# Patient Record
Sex: FEMALE | Race: OTHER | Employment: UNEMPLOYED | URBAN - METROPOLITAN AREA
[De-identification: names, ages, dates, MRNs, and addresses within clinical notes are randomized per-mention and may not be internally consistent; named-entity substitution may affect disease eponyms.]

---

## 2018-06-25 LAB
ANTIBODY SCREEN, EXTERNAL: NEGATIVE
CHLAMYDIA, EXTERNAL: NEGATIVE
HBSAG, EXTERNAL: NEGATIVE
HIV, EXTERNAL: NEGATIVE
N. GONORRHEA, EXTERNAL: NEGATIVE
RPR, EXTERNAL: NON REACTIVE
RUBELLA, EXTERNAL: NORMAL
TYPE, ABO & RH, EXTERNAL: NORMAL

## 2019-01-05 LAB — GRBS, EXTERNAL: NEGATIVE

## 2019-01-22 ENCOUNTER — HOSPITAL ENCOUNTER (INPATIENT)
Age: 24
LOS: 2 days | Discharge: HOME | DRG: 560 | End: 2019-01-24
Attending: OBSTETRICS & GYNECOLOGY | Admitting: OBSTETRICS & GYNECOLOGY
Payer: MEDICAID

## 2019-01-22 PROBLEM — Z33.1 IUP (INTRAUTERINE PREGNANCY), INCIDENTAL: Status: ACTIVE | Noted: 2019-01-22

## 2019-01-22 LAB
ABO + RH BLD: NORMAL
BASOPHILS # BLD: 0 K/UL (ref 0–0.1)
BASOPHILS NFR BLD: 0 % (ref 0–2)
BLOOD GROUP ANTIBODIES SERPL: NORMAL
DIFFERENTIAL METHOD BLD: ABNORMAL
EOSINOPHIL # BLD: 0.1 K/UL (ref 0–0.4)
EOSINOPHIL NFR BLD: 1 % (ref 0–5)
ERYTHROCYTE [DISTWIDTH] IN BLOOD BY AUTOMATED COUNT: 17 % (ref 11.6–14.5)
HCT VFR BLD AUTO: 36 % (ref 35–45)
HGB BLD-MCNC: 10.6 G/DL (ref 12–16)
LYMPHOCYTES # BLD: 2.1 K/UL (ref 0.9–3.6)
LYMPHOCYTES NFR BLD: 19 % (ref 21–52)
MCH RBC QN AUTO: 23.3 PG (ref 24–34)
MCHC RBC AUTO-ENTMCNC: 29.4 G/DL (ref 31–37)
MCV RBC AUTO: 79.3 FL (ref 74–97)
MONOCYTES # BLD: 0.3 K/UL (ref 0.05–1.2)
MONOCYTES NFR BLD: 3 % (ref 3–10)
NEUTS SEG # BLD: 8.6 K/UL (ref 1.8–8)
NEUTS SEG NFR BLD: 77 % (ref 40–73)
PLATELET # BLD AUTO: 249 K/UL (ref 135–420)
PMV BLD AUTO: 10.6 FL (ref 9.2–11.8)
RBC # BLD AUTO: 4.54 M/UL (ref 4.2–5.3)
SPECIMEN EXP DATE BLD: NORMAL
WBC # BLD AUTO: 11.2 K/UL (ref 4.6–13.2)

## 2019-01-22 PROCEDURE — 74011250636 HC RX REV CODE- 250/636: Performed by: ADVANCED PRACTICE MIDWIFE

## 2019-01-22 PROCEDURE — 0HQ9XZZ REPAIR PERINEUM SKIN, EXTERNAL APPROACH: ICD-10-PCS | Performed by: OBSTETRICS & GYNECOLOGY

## 2019-01-22 PROCEDURE — 74011250637 HC RX REV CODE- 250/637: Performed by: ADVANCED PRACTICE MIDWIFE

## 2019-01-22 PROCEDURE — 86900 BLOOD TYPING SEROLOGIC ABO: CPT

## 2019-01-22 PROCEDURE — 74011250636 HC RX REV CODE- 250/636

## 2019-01-22 PROCEDURE — 75410000002 HC LABOR FEE PER 1 HR

## 2019-01-22 PROCEDURE — 75410000003 HC RECOV DEL/VAG/CSECN EA 0.5 HR

## 2019-01-22 PROCEDURE — 85025 COMPLETE CBC W/AUTO DIFF WBC: CPT

## 2019-01-22 PROCEDURE — 75410000000 HC DELIVERY VAGINAL/SINGLE

## 2019-01-22 PROCEDURE — 65270000029 HC RM PRIVATE

## 2019-01-22 RX ORDER — OXYCODONE AND ACETAMINOPHEN 5; 325 MG/1; MG/1
2 TABLET ORAL
Status: DISCONTINUED | OUTPATIENT
Start: 2019-01-22 | End: 2019-01-24 | Stop reason: HOSPADM

## 2019-01-22 RX ORDER — AMOXICILLIN 250 MG
1 CAPSULE ORAL
Status: DISCONTINUED | OUTPATIENT
Start: 2019-01-22 | End: 2019-01-24 | Stop reason: HOSPADM

## 2019-01-22 RX ORDER — BUTORPHANOL TARTRATE 2 MG/ML
2 INJECTION INTRAMUSCULAR; INTRAVENOUS
Status: DISCONTINUED | OUTPATIENT
Start: 2019-01-22 | End: 2019-01-23 | Stop reason: HOSPADM

## 2019-01-22 RX ORDER — HYDROMORPHONE HYDROCHLORIDE 1 MG/ML
1 INJECTION, SOLUTION INTRAMUSCULAR; INTRAVENOUS; SUBCUTANEOUS
Status: DISCONTINUED | OUTPATIENT
Start: 2019-01-22 | End: 2019-01-23 | Stop reason: HOSPADM

## 2019-01-22 RX ORDER — OXYTOCIN/RINGER'S LACTATE 20/1000 ML
999 PLASTIC BAG, INJECTION (ML) INTRAVENOUS ONCE
Status: ACTIVE | OUTPATIENT
Start: 2019-01-22 | End: 2019-01-23

## 2019-01-22 RX ORDER — MINERAL OIL
30 OIL (ML) ORAL AS NEEDED
Status: DISCONTINUED | OUTPATIENT
Start: 2019-01-22 | End: 2019-01-23 | Stop reason: HOSPADM

## 2019-01-22 RX ORDER — OXYTOCIN 10 [USP'U]/ML
INJECTION, SOLUTION INTRAMUSCULAR; INTRAVENOUS
Status: DISPENSED
Start: 2019-01-22 | End: 2019-01-23

## 2019-01-22 RX ORDER — OXYTOCIN/RINGER'S LACTATE 20/1000 ML
125 PLASTIC BAG, INJECTION (ML) INTRAVENOUS CONTINUOUS
Status: DISCONTINUED | OUTPATIENT
Start: 2019-01-22 | End: 2019-01-23 | Stop reason: HOSPADM

## 2019-01-22 RX ORDER — ACETAMINOPHEN 325 MG/1
650 TABLET ORAL
Status: DISCONTINUED | OUTPATIENT
Start: 2019-01-22 | End: 2019-01-24 | Stop reason: HOSPADM

## 2019-01-22 RX ORDER — TERBUTALINE SULFATE 1 MG/ML
0.25 INJECTION SUBCUTANEOUS
Status: DISCONTINUED | OUTPATIENT
Start: 2019-01-22 | End: 2019-01-23 | Stop reason: HOSPADM

## 2019-01-22 RX ORDER — PROMETHAZINE HYDROCHLORIDE 25 MG/ML
25 INJECTION, SOLUTION INTRAMUSCULAR; INTRAVENOUS
Status: DISCONTINUED | OUTPATIENT
Start: 2019-01-22 | End: 2019-01-24 | Stop reason: HOSPADM

## 2019-01-22 RX ORDER — ZOLPIDEM TARTRATE 5 MG/1
5 TABLET ORAL
Status: DISCONTINUED | OUTPATIENT
Start: 2019-01-22 | End: 2019-01-24 | Stop reason: HOSPADM

## 2019-01-22 RX ORDER — NALBUPHINE HYDROCHLORIDE 10 MG/ML
10 INJECTION, SOLUTION INTRAMUSCULAR; INTRAVENOUS; SUBCUTANEOUS
Status: DISCONTINUED | OUTPATIENT
Start: 2019-01-22 | End: 2019-01-23 | Stop reason: HOSPADM

## 2019-01-22 RX ORDER — METHYLERGONOVINE MALEATE 0.2 MG/ML
0.2 INJECTION INTRAVENOUS AS NEEDED
Status: COMPLETED | OUTPATIENT
Start: 2019-01-22 | End: 2019-01-22

## 2019-01-22 RX ORDER — SODIUM CHLORIDE, SODIUM LACTATE, POTASSIUM CHLORIDE, CALCIUM CHLORIDE 600; 310; 30; 20 MG/100ML; MG/100ML; MG/100ML; MG/100ML
125 INJECTION, SOLUTION INTRAVENOUS CONTINUOUS
Status: DISCONTINUED | OUTPATIENT
Start: 2019-01-22 | End: 2019-01-23 | Stop reason: HOSPADM

## 2019-01-22 RX ORDER — OXYTOCIN/RINGER'S LACTATE 20/1000 ML
PLASTIC BAG, INJECTION (ML) INTRAVENOUS
Status: DISPENSED
Start: 2019-01-22 | End: 2019-01-23

## 2019-01-22 RX ORDER — IBUPROFEN 400 MG/1
800 TABLET ORAL
Status: DISCONTINUED | OUTPATIENT
Start: 2019-01-22 | End: 2019-01-24 | Stop reason: HOSPADM

## 2019-01-22 RX ORDER — LIDOCAINE HYDROCHLORIDE 10 MG/ML
20 INJECTION, SOLUTION EPIDURAL; INFILTRATION; INTRACAUDAL; PERINEURAL AS NEEDED
Status: DISCONTINUED | OUTPATIENT
Start: 2019-01-22 | End: 2019-01-23 | Stop reason: HOSPADM

## 2019-01-22 RX ORDER — LIDOCAINE HYDROCHLORIDE 10 MG/ML
INJECTION INFILTRATION; PERINEURAL
Status: COMPLETED
Start: 2019-01-22 | End: 2019-01-22

## 2019-01-22 RX ADMIN — IBUPROFEN 800 MG: 400 TABLET ORAL at 19:39

## 2019-01-22 RX ADMIN — LIDOCAINE HYDROCHLORIDE: 10 INJECTION, SOLUTION INFILTRATION; PERINEURAL at 18:00

## 2019-01-22 RX ADMIN — METHYLERGONOVINE MALEATE 0.2 MG: 0.2 INJECTION INTRAVENOUS at 17:41

## 2019-01-22 NOTE — H&P
History & Physical 
 
Name: Inna Ochoa MRN: [id number]  SSN: [id number] YOB: 1995  Age: 21 y.o. Sex: female Subjective:  
 
Estimated Date of Delivery: None noted. OB History  Para Term  AB Living 1 SAB TAB Ectopic Molar Multiple Live Births # Outcome Date GA Lbr Ricardo/2nd Weight Sex Delivery Anes PTL Lv  
1 Current Ms. Janice Iqbal is admitted with pregnancy at 45 weeks and 5 days for active labor  Prenatal course was complicated by Vitamin D deficiency that was treated. . Please see prenatal records for details. No past medical history on file. No past surgical history on file. Social History Occupational History  Not on file Tobacco Use  Smoking status: Not on file Substance and Sexual Activity  Alcohol use: Not on file  Drug use: Not on file  Sexual activity: Not on file No family history on file. No Known Allergies Prior to Admission medications Not on File Review of Systems: A comprehensive review of systems was negative except for that written in the HPI. Objective:  
 
Vitals: 
Vitals:  
 19 1602 Temp: 97.4 °F (36.3 °C) Physical Exam: 
Patient without distress. Heart: Regular rate and rhythm, S1S2 present or without murmur or extra heart sounds Lung: clear to auscultation throughout lung fields, no wheezes, no rales, no rhonchi and normal respiratory effort Back: costovertebral angle tenderness absent Abdomen: soft, nontender Fundus: soft and non tender Perineum: blood absent, amniotic fluid present Cervical Exam: 10 cm dilated 100% effaced 0 station Presenting Part: cephalic Cervical Position: mid position Consistency: Soft Lower Extremities:  - Edema No 
Membranes:  Spontaneous Rupture of Membranes; Amniotic Fluid: clear fluid Fetal Heart Rate: Reactive, Category 1 Baseline: 130 per minute Variability: moderate Accelerations: yes Decelerations: none Uterine contractions: regular, every 3-4 minutes Prenatal Labs:  
Lab Results Component Value Date/Time ABO/Rh(D) O POSITIVE 01/22/2019 05:35 PM  
 
 
 
Assessment/Plan: Active Problems: 
  IUP (intrauterine pregnancy), incidental (1/22/2019) Plan: Admit for active labor. Patient is non-English speaking from [place] and is accompanied by spouse who assist with translation as well as use of blue phone for translation. .Reassuring fetal status, Labor  Progressing normally. Continue expectant management, Continue plan for vaginal delivery . Group B Strep was negative. Signed By:  Terrence Granados CNM January 22, 2019

## 2019-01-22 NOTE — L&D DELIVERY NOTE
Delivery Note    Obstetrician:  Villa Govea CNM    Assistant: none    Pre-Delivery Diagnosis: Term pregnancy, Spontaneous labor and Single fetus    Post-Delivery Diagnosis: Spontaneous vaginal delivery, Living  infant(s) and Female    Intrapartum Event: None    Procedure: Spontaneous vaginal delivery    Epidural: NO    Monitor:  Fetal Heart Tones - External and Uterine Contractions - External    Indications for instrumental delivery: none    Estimated Blood Loss:  150 ml    Episiotomy: none    Laceration(s):  1st degree and left labial    Laceration(s) repair: YES    Presentation: Cephalic    Fetal Description: weston    Fetal Position: Right Occiput Anterior    Birth Weight: refer to delivery summary    Birth Length: refer to delivery summary    Apgar - One Minute: 8    Apgar - Five Minutes: 9    Umbilical Cord: 3 vessels present and Cord blood sent to lab for type, Rh, and Nikhil' test    Specimens: none           Complications:  None  Patient presented to triage with complaint of active labor. CNM  Called to room to assess progress. VE performed=10/100%/0. Patient pushing with good effort. Spontaneous vaginal delivery of fetal head in OA with restitution to MARY JANE followed by delivery of anterior left shoulder and compound right hand  followed by remainder of fetal body without difficulty. Live female delivered, dried, and stimulated for vigorous cry. Infant placed on maternal abdomen for skin to skin contact. Delayed cord given for greater than 2 minutes. Cord clamped and cut by DEVONTE. Infant transitioned on maternal abdomen by 1-4 All. Refer to delivery summary and nursery notes for NB assessment. Placenta delivered spontaneously intact, Sekou, 3 VC with central insertion noted. Cord blood collected. Postpartum IV pitocin initiated. Fundal massage given and vaginal vault swept for expression of  Moderate clots.  Mild uterine atony noted, Methergine 0.2 mg IM given x 1 in left thigh for good results. Fundus firm and 2 below umbilicus. Perineal inspection revealed intact perineum with 1st degree left labial laceration that was repaired under  local anesthesia  with 4-0 vicryl in usual fashion for good hemostasis. EBL = 150 ml. Mom and infant stable. Sponges, sharps and instrument count correct at end of procedure.              Cord Blood Results:   Information for the patient's :  Pedro Roque [[id number]]   No results found for: PCTABR, PCTDIG, BILI, 82 Elise Vincent    Prenatal Labs:     Lab Results   Component Value Date/Time    ABO/Rh(D) O POSITIVE 2019 05:35 PM        Attending Attestation: I was present and scrubbed for the entire procedure    Signed By:  Loreto Carmichael CNM     2019

## 2019-01-23 LAB
HCT VFR BLD AUTO: 30.6 % (ref 35–45)
HGB BLD-MCNC: 9.1 G/DL (ref 12–16)

## 2019-01-23 PROCEDURE — 65270000029 HC RM PRIVATE

## 2019-01-23 PROCEDURE — 74011250637 HC RX REV CODE- 250/637: Performed by: ADVANCED PRACTICE MIDWIFE

## 2019-01-23 PROCEDURE — 36415 COLL VENOUS BLD VENIPUNCTURE: CPT

## 2019-01-23 PROCEDURE — 85018 HEMOGLOBIN: CPT

## 2019-01-23 PROCEDURE — 85014 HEMATOCRIT: CPT

## 2019-01-23 RX ORDER — IBUPROFEN 800 MG/1
800 TABLET ORAL
Qty: 30 TAB | Refills: 0 | Status: SHIPPED | OUTPATIENT
Start: 2019-01-23 | End: 2019-06-07

## 2019-01-23 RX ADMIN — IBUPROFEN 800 MG: 400 TABLET ORAL at 22:59

## 2019-01-23 RX ADMIN — ZOLPIDEM TARTRATE 5 MG: 5 TABLET ORAL at 02:02

## 2019-01-23 RX ADMIN — ACETAMINOPHEN 650 MG: 325 TABLET ORAL at 09:56

## 2019-01-23 NOTE — ADT AUTH CERT NOTES
Patient Demographics Patient Name Jesus Nicholson 
[id number] Sex Female  
1995 Address [address] [place] [address] Phone [phone] (Mobile) CSN:  
[id number] Admit Date: Admit Time Room Bed 2019  3:46 PM  [29783]  [[id number]] Attending Providers Provider Pager From To Racquel Patel MD  19 Birth History Birth Information Birth Length: 46 cm Birth Weight: 3.05 kg Birth Head Circ: 34 cm Delivery Method: Vaginal, Spontaneous Duration of Labor: 1st: 1h 25m / 2nd: 22m APGARs 1 Minute: 8  
5 Minute: 9

## 2019-01-23 NOTE — PROGRESS NOTES
1535-Pt arrived at on unit. Pt placed in triage room 1. Obtaining blue phone. Used blue phone for communication. 1600-Pt placed on the monitor. 1630-Pt ruptured. Clear. SVE 3-4.  
 
1700-Pt complete Vonda ALLEN CNM. Moved to room 7 started pushing with pt and CNM at bedside.

## 2019-01-23 NOTE — PROGRESS NOTES
Received patient form L&D. Report received from [facility] at bedside. Assessment completed. Oriented patient to room, call bell included. Informed of available pain medication. Instructed to call for increased pain, bleeding, large clots. Patient verbalized understanding. Information given through an  using language line,  #[phone].

## 2019-01-23 NOTE — PROGRESS NOTES
1920 Bedside and Verbal shift change report given to FADY Pina RN  (oncoming nurse) by Alberto Long RN  (offgoing nurse). Report included the following information SBAR, Kardex, Intake/Output, MAR and Recent Results. 1930 Assessment complete with the assistance of patient . Denies headache, blurry vision, floaters or epigastric pain. Pain 5/10. Firm @ U with scant lochia. 1950 Up to bathroom. Unable to void. Marizol care done. Clean marizol pad, ice pack and gown provided. 2100 PP bed provided to patient. 2200 Patient resting with FOB in room. Denies needs. Call light within reach. 2305 Bedside and Verbal shift change report given to REBA Matos RN (oncoming nurse) by FADY Desouza RN  (offgoing nurse). Report included the following information SBAR, Kardex, Intake/Output, MAR and Recent Results.

## 2019-01-23 NOTE — PROGRESS NOTES
2305 Bedside and Verbal shift change report given to REBA Matos RN (oncoming nurse) by FADY Pina RN (offgoing nurse). Report included the following information SBAR, Kardex, Procedure Summary, Intake/Output, MAR and Recent Results 0155  PT ambulated to bathroom to void. Marizol-care and clean pads provided. Pt states she can't sleep.  
 
0202 Ambien administered. Lights dimmed. 0345 Pt fundus deviated to right. Encouraged pt to use bathroom. However, pt doesn't want to get up at this time. Risk of full bladder discussed. 0540 Pt ambulated to bathroom to void. 1000 ml of urine voided. Marizol-care and clean pads provided. 0710 Bedside and Verbal shift change report given to DEAN Corbett RN (oncoming nurse) by Taylor Pace (offgoing nurse). Report included the following information SBAR, Kardex, Procedure Summary, Intake/Output, MAR and Recent Results.

## 2019-01-23 NOTE — PROGRESS NOTES
Progress Note Patient: Ruben Maya MRN: [id number] YOB: 1995  Age: 21 y.o. Subjective:  
 
Postpartum Day: 1 The patient is feeling well. Pain is  well controlled with current medications. Baby is feeding via breast without difficulty. Urinary output is adequate. Objective:  
  
Patient Vitals for the past 8 hrs: 
 BP Temp Pulse Resp  
19 0853 116/68 97.8 °F (36.6 °C) 82 16 General:    alert, cooperative Lochia:  appropriate Uterine Fundus:   firm @ umbilicus Perineum:  Intact DVT Evaluation:  No evidence of DVT seen on physical exam.  
 
Lab/Data Review: 
Recent Results (from the past 24 hour(s)) CBC WITH AUTOMATED DIFF Collection Time: 19  5:35 PM  
Result Value Ref Range WBC 11.2 4.6 - 13.2 K/uL  
 RBC 4.54 4.20 - 5.30 M/uL  
 HGB 10.6 (L) 12.0 - 16.0 g/dL HCT 36.0 35.0 - 45.0 % MCV 79.3 74.0 - 97.0 FL  
 MCH 23.3 (L) 24.0 - 34.0 PG  
 MCHC 29.4 (L) 31.0 - 37.0 g/dL  
 RDW 17.0 (H) 11.6 - 14.5 % PLATELET 448 154 - 879 K/uL MPV 10.6 9.2 - 11.8 FL  
 NEUTROPHILS 77 (H) 40 - 73 % LYMPHOCYTES 19 (L) 21 - 52 % MONOCYTES 3 3 - 10 % EOSINOPHILS 1 0 - 5 % BASOPHILS 0 0 - 2 %  
 ABS. NEUTROPHILS 8.6 (H) 1.8 - 8.0 K/UL  
 ABS. LYMPHOCYTES 2.1 0.9 - 3.6 K/UL  
 ABS. MONOCYTES 0.3 0.05 - 1.2 K/UL  
 ABS. EOSINOPHILS 0.1 0.0 - 0.4 K/UL  
 ABS. BASOPHILS 0.0 0.0 - 0.1 K/UL  
 DF AUTOMATED    
TYPE & SCREEN Collection Time: 19  5:35 PM  
Result Value Ref Range Crossmatch Expiration 2019 ABO/Rh(D) O POSITIVE Antibody screen NEG   
HEMOGLOBIN Collection Time: 19  3:35 AM  
Result Value Ref Range HGB 9.1 (L) 12.0 - 16.0 g/dL HEMATOCRIT Collection Time: 19  3:35 AM  
Result Value Ref Range HCT 30.6 (L) 35.0 - 45.0 % All lab results for the last 24 hours reviewed. Assessment:  
 
Delivery:  Plan:  
 
Doing well postpartum vaginal delivery. Continue current postpartum care. Encouraged hydration, nutrition and ambulation. DC home tomorrow. Follow-up in office in 6 weeks. Call prn. There are no discharge medications for this patient. Signed By: Bria Smith CNM January 23, 2019

## 2019-01-23 NOTE — ROUTINE PROCESS
number [phone] used via blue phone to confirm identity, allergies, history, assessment completed, needs assessed, opportunity for additional questions provided, patient assisted with choosing breakfast selections with assistance of . RN called to order patient's breakfast and Shanda Ian (original staff member receiving order) states that on her screen she has a noted allergy to eggs, wheat, and tree nuts. This RN explained that patient has no history of this allergy and this has been confirmed today during assessment and with use of , this also explained to Liza Almazan, manager in house for dietary and states she will speak to dietician regarding allergy that is showing on their record. Dietician called unit and states that her record view shows no allergies as well and this RN confirmed via prenatal record and assessment this morning, dietician requested confirmation with  assistance regarding specific allergies listed in the record in the dietary department. 3705Odelia Edelson number [id number] used via blue phone to confirm patient's allergy history. Patient states she has no allergies, including no food allergies, no drug allergies, no seasonal allergies. Patient denies every having a history of allergies or ever reporting a history of allergies on any occasion or presentation for medical assistance at any facility. 3680: Dietician called and report given of confirmation of no allergies, blue phone used to make confirmation and dietician states the allergy will be removed at this time. 3775: This RN called room service and ordered patient's breakfast selection. 1000: This RN noted patient alone in room with her toddler and infant.  Blue phone  number [phone] used to explain to patient that at no time can her toddler be left alone in room with her while she is a patient and that another adult must always be present, patient states that her  should be back in a few moments. 1435:  TRANSFER - OUT REPORT: 
 
Verbal report given to Dhruv Encarnacion RN (name) on Vanda Dense  being transferred to mother/baby (unit) for routine progression of care Report consisted of patients Situation, Background, Assessment and  
Recommendations(SBAR). Information from the following report(s) SBAR, MAR and Recent Results was reviewed with the receiving nurse. Lines:    
 
Opportunity for questions and clarification was provided.

## 2019-01-23 NOTE — PROGRESS NOTES
Patient was visited by [facility] volunteer Jelani Paz. Volunteer conducted a Spiritual Care Screening and reported no needs to this . Baby [place] Card was provided. Chaplains will continue to follow and will provide pastoral care as needed or requested. [address], M.Div. Board Certified [employer] [phone] - Office

## 2019-01-24 VITALS
SYSTOLIC BLOOD PRESSURE: 106 MMHG | TEMPERATURE: 97.9 F | RESPIRATION RATE: 18 BRPM | DIASTOLIC BLOOD PRESSURE: 62 MMHG | OXYGEN SATURATION: 100 % | HEART RATE: 79 BPM

## 2019-01-24 NOTE — PROGRESS NOTES
1915 - Bedside and Verbal shift change report given to DEAN Treadwell RN (oncoming nurse) by ABI Granados RN (offgoing nurse). Report included the following information SBAR, Kardex, Intake/Output, MAR and Recent Results. 2245 - Assessment complete. POC discussed. Patient needs met at this time.

## 2019-01-24 NOTE — PROGRESS NOTES
Bedside and Verbal shift change report given to FLAVIA Boggs RN (oncoming nurse) by Dorys Ricci RN 
 (offgoing nurse). Report given with SBAR and Kardex.

## 2019-06-07 ENCOUNTER — HOSPITAL ENCOUNTER (EMERGENCY)
Age: 24
Discharge: HOME | End: 2019-06-08
Attending: EMERGENCY MEDICINE
Payer: MEDICAID

## 2019-06-07 ENCOUNTER — ANESTHESIA EVENT (OUTPATIENT)
Dept: SURGERY | Age: 24
End: 2019-06-07
Payer: MEDICAID

## 2019-06-07 DIAGNOSIS — O03.9 MISCARRIAGE: Primary | ICD-10-CM

## 2019-06-07 DIAGNOSIS — N93.9 VAGINAL BLEEDING: ICD-10-CM

## 2019-06-07 PROCEDURE — 99283 EMERGENCY DEPT VISIT LOW MDM: CPT

## 2019-06-08 VITALS
BODY MASS INDEX: 23.9 KG/M2 | OXYGEN SATURATION: 100 % | WEIGHT: 140 LBS | HEIGHT: 64 IN | TEMPERATURE: 98.4 F | HEART RATE: 78 BPM | RESPIRATION RATE: 16 BRPM | DIASTOLIC BLOOD PRESSURE: 64 MMHG | SYSTOLIC BLOOD PRESSURE: 120 MMHG

## 2019-06-08 LAB
ALBUMIN SERPL-MCNC: 3.7 G/DL (ref 3.4–5)
ALBUMIN/GLOB SERPL: 1 {RATIO} (ref 0.8–1.7)
ALP SERPL-CCNC: 57 U/L (ref 45–117)
ALT SERPL-CCNC: 20 U/L (ref 13–56)
ANION GAP SERPL CALC-SCNC: 9 MMOL/L (ref 3–18)
AST SERPL-CCNC: 15 U/L (ref 15–37)
BASOPHILS # BLD: 0 K/UL (ref 0–0.1)
BASOPHILS NFR BLD: 1 % (ref 0–2)
BILIRUB SERPL-MCNC: 0.3 MG/DL (ref 0.2–1)
BUN SERPL-MCNC: 10 MG/DL (ref 7–18)
BUN/CREAT SERPL: 16 (ref 12–20)
CALCIUM SERPL-MCNC: 8.7 MG/DL (ref 8.5–10.1)
CHLORIDE SERPL-SCNC: 108 MMOL/L (ref 100–108)
CO2 SERPL-SCNC: 23 MMOL/L (ref 21–32)
CREAT SERPL-MCNC: 0.62 MG/DL (ref 0.6–1.3)
DIFFERENTIAL METHOD BLD: ABNORMAL
EOSINOPHIL # BLD: 0.3 K/UL (ref 0–0.4)
EOSINOPHIL NFR BLD: 5 % (ref 0–5)
ERYTHROCYTE [DISTWIDTH] IN BLOOD BY AUTOMATED COUNT: 14.2 % (ref 11.6–14.5)
GLOBULIN SER CALC-MCNC: 3.7 G/DL (ref 2–4)
GLUCOSE SERPL-MCNC: 124 MG/DL (ref 74–99)
HCT VFR BLD AUTO: 34.9 % (ref 35–45)
HGB BLD-MCNC: 10.9 G/DL (ref 12–16)
LYMPHOCYTES # BLD: 1.6 K/UL (ref 0.9–3.6)
LYMPHOCYTES NFR BLD: 25 % (ref 21–52)
MCH RBC QN AUTO: 26.1 PG (ref 24–34)
MCHC RBC AUTO-ENTMCNC: 31.2 G/DL (ref 31–37)
MCV RBC AUTO: 83.7 FL (ref 74–97)
MONOCYTES # BLD: 0.4 K/UL (ref 0.05–1.2)
MONOCYTES NFR BLD: 6 % (ref 3–10)
NEUTS SEG # BLD: 4.1 K/UL (ref 1.8–8)
NEUTS SEG NFR BLD: 63 % (ref 40–73)
PLATELET # BLD AUTO: 219 K/UL (ref 135–420)
PMV BLD AUTO: 10 FL (ref 9.2–11.8)
POTASSIUM SERPL-SCNC: 3.6 MMOL/L (ref 3.5–5.5)
PROT SERPL-MCNC: 7.4 G/DL (ref 6.4–8.2)
RBC # BLD AUTO: 4.17 M/UL (ref 4.2–5.3)
SODIUM SERPL-SCNC: 140 MMOL/L (ref 136–145)
WBC # BLD AUTO: 6.5 K/UL (ref 4.6–13.2)

## 2019-06-08 PROCEDURE — 96374 THER/PROPH/DIAG INJ IV PUSH: CPT

## 2019-06-08 PROCEDURE — 96375 TX/PRO/DX INJ NEW DRUG ADDON: CPT

## 2019-06-08 PROCEDURE — 80053 COMPREHEN METABOLIC PANEL: CPT

## 2019-06-08 PROCEDURE — 74011250636 HC RX REV CODE- 250/636: Performed by: PHYSICIAN ASSISTANT

## 2019-06-08 PROCEDURE — 85025 COMPLETE CBC W/AUTO DIFF WBC: CPT

## 2019-06-08 RX ORDER — ONDANSETRON 2 MG/ML
INJECTION INTRAMUSCULAR; INTRAVENOUS
Status: DISCONTINUED
Start: 2019-06-08 | End: 2019-06-08 | Stop reason: HOSPADM

## 2019-06-08 RX ORDER — ONDANSETRON 4 MG/1
4 TABLET, ORALLY DISINTEGRATING ORAL
Qty: 12 TAB | Refills: 0 | Status: SHIPPED | OUTPATIENT
Start: 2019-06-08 | End: 2019-06-10

## 2019-06-08 RX ORDER — ONDANSETRON 2 MG/ML
4 INJECTION INTRAMUSCULAR; INTRAVENOUS
Status: COMPLETED | OUTPATIENT
Start: 2019-06-08 | End: 2019-06-08

## 2019-06-08 RX ADMIN — ONDANSETRON 4 MG: 2 INJECTION INTRAMUSCULAR; INTRAVENOUS at 01:44

## 2019-06-08 RX ADMIN — SODIUM CHLORIDE 1000 ML: 900 INJECTION, SOLUTION INTRAVENOUS at 01:43

## 2019-06-08 NOTE — ED PROVIDER NOTES
EMERGENCY DEPARTMENT HISTORY AND PHYSICAL EXAM    Date: 6/7/2019  Patient Name: Amairani Hester    History of Presenting Illness     Chief Complaint   Patient presents with    Vaginal Bleeding         History Provided By: Patient    Amairani Hester is a 21 y.o. female with PMHX of miscarriage who presents to the emergency department C/O light vaginal bleeding, nausea, back pain. Patient was recently told that she is having a miscarriage and is scheduled to have a D&C on Monday. Patient states she has had light vaginal bleeding today and was advised to come into the ER with any vaginal bleeding. Patient denies fever, chills, chest pain, shortness of breath, dysuria. PCP: Gabrielle, MD Margaret        Past History     Past Medical History:  History reviewed. No pertinent past medical history. Past Surgical History:  History reviewed. No pertinent surgical history. Family History:  History reviewed. No pertinent family history. Social History:  Social History     Tobacco Use    Smoking status: Never Smoker    Smokeless tobacco: Never Used   Substance Use Topics    Alcohol use: Never     Frequency: Never    Drug use: Never       Allergies:  No Known Allergies      Review of Systems   Review of Systems   Constitutional: Negative for chills and fever. Respiratory: Negative for shortness of breath and wheezing. Cardiovascular: Negative for chest pain. Genitourinary: Positive for vaginal bleeding. Negative for dysuria, hematuria, vaginal discharge and vaginal pain. Neurological: Negative for dizziness and weakness. All other systems reviewed and are negative. Physical Exam     Vitals:    06/07/19 2343 06/08/19 0206   BP: 122/62 120/64   Pulse: 87 78   Resp: 16 16   Temp: 99 °F (37.2 °C) 98.4 °F (36.9 °C)   SpO2: 100% 100%   Weight: 63.5 kg (140 lb)    Height: 5' 4\" (1.626 m)      Physical Exam   Nursing note and vitals reviewed.       CONSTITUTIONAL: Alert, in no apparent distress; well-developed; well-nourished. HEAD:  Normocephalic, atraumatic  EYES: PERRL; EOM's intact. ENTM: Nose: no rhinorrhea; Throat: no erythema or exudate, mucous membranes moist  Neck:  No JVD, supple without lymphadenopathy  RESP: Chest clear, equal breath sounds. CV: S1 and S2 WNL; No murmurs, gallops or rubs. GI: Normal bowel sounds, abdomen soft and non-tender. No masses or organomegaly. : No costo-vertebral angle tenderness. BACK:  Non-tender  UPPER EXT:  Normal inspection  LOWER EXT: No edema, no calf tenderness. Distal pulses intact. NEURO: CN intact, reflexes 2/4 and sym, strength 5/5 and sym, sensation intact. SKIN: normal for age and stage. PSYCH:  Alert and oriented, normal affect. Diagnostic Study Results     Labs -     Recent Results (from the past 12 hour(s))   CBC WITH AUTOMATED DIFF    Collection Time: 06/08/19  1:12 AM   Result Value Ref Range    WBC 6.5 4.6 - 13.2 K/uL    RBC 4.17 (L) 4.20 - 5.30 M/uL    HGB 10.9 (L) 12.0 - 16.0 g/dL    HCT 34.9 (L) 35.0 - 45.0 %    MCV 83.7 74.0 - 97.0 FL    MCH 26.1 24.0 - 34.0 PG    MCHC 31.2 31.0 - 37.0 g/dL    RDW 14.2 11.6 - 14.5 %    PLATELET 462 206 - 460 K/uL    MPV 10.0 9.2 - 11.8 FL    NEUTROPHILS 63 40 - 73 %    LYMPHOCYTES 25 21 - 52 %    MONOCYTES 6 3 - 10 %    EOSINOPHILS 5 0 - 5 %    BASOPHILS 1 0 - 2 %    ABS. NEUTROPHILS 4.1 1.8 - 8.0 K/UL    ABS. LYMPHOCYTES 1.6 0.9 - 3.6 K/UL    ABS. MONOCYTES 0.4 0.05 - 1.2 K/UL    ABS. EOSINOPHILS 0.3 0.0 - 0.4 K/UL    ABS.  BASOPHILS 0.0 0.0 - 0.1 K/UL    DF AUTOMATED     METABOLIC PANEL, COMPREHENSIVE    Collection Time: 06/08/19  1:12 AM   Result Value Ref Range    Sodium 140 136 - 145 mmol/L    Potassium 3.6 3.5 - 5.5 mmol/L    Chloride 108 100 - 108 mmol/L    CO2 23 21 - 32 mmol/L    Anion gap 9 3.0 - 18 mmol/L    Glucose 124 (H) 74 - 99 mg/dL    BUN 10 7.0 - 18 MG/DL    Creatinine 0.62 0.6 - 1.3 MG/DL    BUN/Creatinine ratio 16 12 - 20      GFR est AA >60 >60 ml/min/1.73m2    GFR est non-AA >60 >60 ml/min/1.73m2    Calcium 8.7 8.5 - 10.1 MG/DL    Bilirubin, total 0.3 0.2 - 1.0 MG/DL    ALT (SGPT) 20 13 - 56 U/L    AST (SGOT) 15 15 - 37 U/L    Alk. phosphatase 57 45 - 117 U/L    Protein, total 7.4 6.4 - 8.2 g/dL    Albumin 3.7 3.4 - 5.0 g/dL    Globulin 3.7 2.0 - 4.0 g/dL    A-G Ratio 1.0 0.8 - 1.7         Radiologic Studies -   No orders to display     CT Results  (Last 48 hours)    None        CXR Results  (Last 48 hours)    None          Medications given in the ED-  Medications   ondansetron (ZOFRAN) 4 mg/2 mL injection (has no administration in time range)   sodium chloride 0.9 % bolus infusion 1,000 mL (0 mL IntraVENous IV Completed 6/8/19 0208)   ondansetron (ZOFRAN) injection 4 mg (4 mg IntraVENous Given 6/8/19 0144)         Medical Decision Making   I am the first provider for this patient. I reviewed the vital signs, available nursing notes, past medical history, past surgical history, family history and social history. Vital Signs-Reviewed the patient's vital signs. Pulse Oximetry Analysis - 100% on RA     Cardiac Monitor:  Rate: 78 bpm  Rhythm: NSR    Records Reviewed: Old Medical Records    Procedures:  Pelvic Exam  Date/Time: 6/8/2019 3:14 AM  Performed by: PA  Procedure duration:  10 minutes. Exam assisted by:  Andreas Rojas. Type of exam performed: speculum. External genitalia appearance: normal.    Vagina findings: No active vaginal bleeding, blood noted in vaginal vault. Cervix closed. Cervical exam:  normal.    Specimen(s) collected:  none. Bimanual exam:  normal.    Patient tolerance: Patient tolerated the procedure well with no immediate complications          ED Course:   12:32 AM  Initial assessment performed. The patients presenting problems have been discussed, and they are in agreement with the care plan formulated and outlined with them.   I have encouraged them to ask questions as they arise throughout their visit.    1:22 AM  Pelvic exam performed without noted active bleeding from the cervix, minimal blood noted in the vaginal vault. Discussed all labs with patient and , all questions answered. Diagnosis and Disposition       Discussion: 21 y.o. female with history of a miscarriage with plan for D&C on Monday, no evidence of heavy active bleeding on exam with labs within normal limits. Recommend follow-up with surgery on Monday as scheduled return to the ER with new or worsening symptoms including feeling more than 1 pad per hour with blood. Patient and  verbalized understanding. Return precautions discussed. DISCHARGE NOTE:  Erby Snellen  results have been reviewed with her. She has been counseled regarding her diagnosis, treatment, and plan. She verbally conveys understanding and agreement of the signs, symptoms, diagnosis, treatment and prognosis and additionally agrees to follow up as discussed. She also agrees with the care-plan and conveys that all of her questions have been answered. I have also provided discharge instructions for her that include: educational information regarding their diagnosis and treatment, and list of reasons why they would want to return to the ED prior to their follow-up appointment, should her condition change. She has been provided with education for proper emergency department utilization. CLINICAL IMPRESSION:    1. Miscarriage    2. Vaginal bleeding        PLAN:  1. D/C Home  2. Discharge Medication List as of 6/8/2019  1:53 AM      START taking these medications    Details   ondansetron (ZOFRAN ODT) 4 mg disintegrating tablet Take 1 Tab by mouth every eight (8) hours as needed for Nausea. Indications: Excessive Vomiting in Pregnancy, Print, Disp-12 Tab, R-0           3.    Follow-up Information     Follow up With Specialties Details Why Contact Info    [facility] [place] For [place] as scheduled [facility] [place] [id number]  [address]    [facility] EMERGENCY DEPT Emergency Medicine Go to As needed, If symptoms worsen [address] [address]  [phone]          Note completed by Nori Keen PA-C        Please note that this dictation was completed with [employer], the computer voice recognition software. Quite often unanticipated grammatical, syntax, homophones, and other interpretive errors are inadvertently transcribed by the computer software. Please disregard these errors. Please excuse any errors that have escaped final proofreading.

## 2019-06-08 NOTE — DISCHARGE INSTRUCTIONS
Follow-up with [facility]. Monday as scheduled for D&C, return to the ER with new or worsening symptoms including bleeding feeling up more than 1 pad per hour, lightheadedness, dizziness, inability to keep food or drink down. Did take nausea medication as needed.

## 2019-06-10 ENCOUNTER — ANESTHESIA (OUTPATIENT)
Dept: SURGERY | Age: 24
End: 2019-06-10
Payer: MEDICAID

## 2019-06-10 ENCOUNTER — HOSPITAL ENCOUNTER (OUTPATIENT)
Age: 24
Setting detail: OUTPATIENT SURGERY
Discharge: HOME | End: 2019-06-10
Attending: OBSTETRICS & GYNECOLOGY | Admitting: OBSTETRICS & GYNECOLOGY
Payer: MEDICAID

## 2019-06-10 VITALS
HEART RATE: 59 BPM | DIASTOLIC BLOOD PRESSURE: 60 MMHG | BODY MASS INDEX: 24.64 KG/M2 | SYSTOLIC BLOOD PRESSURE: 108 MMHG | OXYGEN SATURATION: 100 % | HEIGHT: 60 IN | WEIGHT: 125.5 LBS | RESPIRATION RATE: 12 BRPM | TEMPERATURE: 97.8 F

## 2019-06-10 PROBLEM — O02.1 MISSED ABORTION: Status: ACTIVE | Noted: 2019-06-10

## 2019-06-10 LAB
ABO + RH BLD: NORMAL
BLOOD GROUP ANTIBODIES SERPL: NORMAL
SPECIMEN EXP DATE BLD: NORMAL

## 2019-06-10 PROCEDURE — 88305 TISSUE EXAM BY PATHOLOGIST: CPT

## 2019-06-10 PROCEDURE — 74011250636 HC RX REV CODE- 250/636

## 2019-06-10 PROCEDURE — 77030020782 HC GWN BAIR PAWS FLX 3M -B: Performed by: OBSTETRICS & GYNECOLOGY

## 2019-06-10 PROCEDURE — 77030011210: Performed by: OBSTETRICS & GYNECOLOGY

## 2019-06-10 PROCEDURE — 77030008578 HC TBNG UTER SUC BUSS -A: Performed by: OBSTETRICS & GYNECOLOGY

## 2019-06-10 PROCEDURE — 74011250637 HC RX REV CODE- 250/637: Performed by: ANESTHESIOLOGY

## 2019-06-10 PROCEDURE — 86900 BLOOD TYPING SEROLOGIC ABO: CPT

## 2019-06-10 PROCEDURE — 76210000026 HC REC RM PH II 1 TO 1.5 HR: Performed by: OBSTETRICS & GYNECOLOGY

## 2019-06-10 PROCEDURE — 74011250636 HC RX REV CODE- 250/636: Performed by: OBSTETRICS & GYNECOLOGY

## 2019-06-10 PROCEDURE — 74011000250 HC RX REV CODE- 250

## 2019-06-10 PROCEDURE — 76210000063 HC OR PH I REC FIRST 0.5 HR: Performed by: OBSTETRICS & GYNECOLOGY

## 2019-06-10 PROCEDURE — 76010000138 HC OR TIME 0.5 TO 1 HR: Performed by: OBSTETRICS & GYNECOLOGY

## 2019-06-10 PROCEDURE — 36415 COLL VENOUS BLD VENIPUNCTURE: CPT

## 2019-06-10 PROCEDURE — 77030018836 HC SOL IRR NACL ICUM -A: Performed by: OBSTETRICS & GYNECOLOGY

## 2019-06-10 PROCEDURE — 77030032490 HC SLV COMPR SCD KNE COVD -B: Performed by: OBSTETRICS & GYNECOLOGY

## 2019-06-10 PROCEDURE — 74011000250 HC RX REV CODE- 250: Performed by: OBSTETRICS & GYNECOLOGY

## 2019-06-10 PROCEDURE — 76060000032 HC ANESTHESIA 0.5 TO 1 HR: Performed by: OBSTETRICS & GYNECOLOGY

## 2019-06-10 RX ORDER — LIDOCAINE HYDROCHLORIDE 20 MG/ML
INJECTION, SOLUTION EPIDURAL; INFILTRATION; INTRACAUDAL; PERINEURAL AS NEEDED
Status: DISCONTINUED | OUTPATIENT
Start: 2019-06-10 | End: 2019-06-10 | Stop reason: HOSPADM

## 2019-06-10 RX ORDER — OXYCODONE AND ACETAMINOPHEN 5; 325 MG/1; MG/1
2 TABLET ORAL
Status: CANCELLED | OUTPATIENT
Start: 2019-06-10

## 2019-06-10 RX ORDER — FENTANYL CITRATE 50 UG/ML
INJECTION, SOLUTION INTRAMUSCULAR; INTRAVENOUS AS NEEDED
Status: DISCONTINUED | OUTPATIENT
Start: 2019-06-10 | End: 2019-06-10 | Stop reason: HOSPADM

## 2019-06-10 RX ORDER — IBUPROFEN 600 MG/1
600 TABLET ORAL
Qty: 30 TAB | Refills: 0 | Status: SHIPPED | OUTPATIENT
Start: 2019-06-10 | End: 2021-02-25

## 2019-06-10 RX ORDER — SODIUM CHLORIDE 0.9 % (FLUSH) 0.9 %
5-40 SYRINGE (ML) INJECTION AS NEEDED
Status: DISCONTINUED | OUTPATIENT
Start: 2019-06-10 | End: 2019-06-10 | Stop reason: HOSPADM

## 2019-06-10 RX ORDER — SODIUM CHLORIDE 0.9 % (FLUSH) 0.9 %
5-40 SYRINGE (ML) INJECTION EVERY 8 HOURS
Status: CANCELLED | OUTPATIENT
Start: 2019-06-10

## 2019-06-10 RX ORDER — SODIUM CHLORIDE, SODIUM LACTATE, POTASSIUM CHLORIDE, CALCIUM CHLORIDE 600; 310; 30; 20 MG/100ML; MG/100ML; MG/100ML; MG/100ML
1000 INJECTION, SOLUTION INTRAVENOUS CONTINUOUS
Status: DISCONTINUED | OUTPATIENT
Start: 2019-06-10 | End: 2019-06-10 | Stop reason: HOSPADM

## 2019-06-10 RX ORDER — HYDROMORPHONE HYDROCHLORIDE 2 MG/ML
0.5 INJECTION, SOLUTION INTRAMUSCULAR; INTRAVENOUS; SUBCUTANEOUS
Status: DISCONTINUED | OUTPATIENT
Start: 2019-06-10 | End: 2019-06-10 | Stop reason: HOSPADM

## 2019-06-10 RX ORDER — MIDAZOLAM HYDROCHLORIDE 1 MG/ML
INJECTION, SOLUTION INTRAMUSCULAR; INTRAVENOUS AS NEEDED
Status: DISCONTINUED | OUTPATIENT
Start: 2019-06-10 | End: 2019-06-10 | Stop reason: HOSPADM

## 2019-06-10 RX ORDER — SILVER NITRATE 38.21; 12.74 MG/1; MG/1
STICK TOPICAL AS NEEDED
Status: DISCONTINUED | OUTPATIENT
Start: 2019-06-10 | End: 2019-06-10 | Stop reason: HOSPADM

## 2019-06-10 RX ORDER — GLYCOPYRROLATE 0.2 MG/ML
INJECTION INTRAMUSCULAR; INTRAVENOUS AS NEEDED
Status: DISCONTINUED | OUTPATIENT
Start: 2019-06-10 | End: 2019-06-10 | Stop reason: HOSPADM

## 2019-06-10 RX ORDER — ONDANSETRON 2 MG/ML
INJECTION INTRAMUSCULAR; INTRAVENOUS AS NEEDED
Status: DISCONTINUED | OUTPATIENT
Start: 2019-06-10 | End: 2019-06-10 | Stop reason: HOSPADM

## 2019-06-10 RX ORDER — MAGNESIUM SULFATE 100 %
4 CRYSTALS MISCELLANEOUS AS NEEDED
Status: DISCONTINUED | OUTPATIENT
Start: 2019-06-10 | End: 2019-06-10 | Stop reason: HOSPADM

## 2019-06-10 RX ORDER — SODIUM CHLORIDE, SODIUM LACTATE, POTASSIUM CHLORIDE, CALCIUM CHLORIDE 600; 310; 30; 20 MG/100ML; MG/100ML; MG/100ML; MG/100ML
125 INJECTION, SOLUTION INTRAVENOUS CONTINUOUS
Status: DISCONTINUED | OUTPATIENT
Start: 2019-06-10 | End: 2019-06-10 | Stop reason: HOSPADM

## 2019-06-10 RX ORDER — PROPOFOL 10 MG/ML
INJECTION, EMULSION INTRAVENOUS AS NEEDED
Status: DISCONTINUED | OUTPATIENT
Start: 2019-06-10 | End: 2019-06-10 | Stop reason: HOSPADM

## 2019-06-10 RX ORDER — SODIUM CHLORIDE 0.9 % (FLUSH) 0.9 %
5-40 SYRINGE (ML) INJECTION AS NEEDED
Status: CANCELLED | OUTPATIENT
Start: 2019-06-10

## 2019-06-10 RX ORDER — OXYCODONE AND ACETAMINOPHEN 5; 325 MG/1; MG/1
1 TABLET ORAL
Status: CANCELLED | OUTPATIENT
Start: 2019-06-10

## 2019-06-10 RX ORDER — KETOROLAC TROMETHAMINE 30 MG/ML
30 INJECTION, SOLUTION INTRAMUSCULAR; INTRAVENOUS
Status: CANCELLED | OUTPATIENT
Start: 2019-06-10 | End: 2019-06-11

## 2019-06-10 RX ORDER — DEXAMETHASONE SODIUM PHOSPHATE 4 MG/ML
INJECTION, SOLUTION INTRA-ARTICULAR; INTRALESIONAL; INTRAMUSCULAR; INTRAVENOUS; SOFT TISSUE AS NEEDED
Status: DISCONTINUED | OUTPATIENT
Start: 2019-06-10 | End: 2019-06-10 | Stop reason: HOSPADM

## 2019-06-10 RX ORDER — OXYCODONE HYDROCHLORIDE 5 MG/1
5 TABLET ORAL
Status: COMPLETED | OUTPATIENT
Start: 2019-06-10 | End: 2019-06-10

## 2019-06-10 RX ORDER — FLUMAZENIL 0.1 MG/ML
0.2 INJECTION INTRAVENOUS
Status: DISCONTINUED | OUTPATIENT
Start: 2019-06-10 | End: 2019-06-10 | Stop reason: HOSPADM

## 2019-06-10 RX ORDER — EPHEDRINE SULFATE/0.9% NACL/PF 25 MG/5 ML
SYRINGE (ML) INTRAVENOUS AS NEEDED
Status: DISCONTINUED | OUTPATIENT
Start: 2019-06-10 | End: 2019-06-10 | Stop reason: HOSPADM

## 2019-06-10 RX ORDER — SODIUM CHLORIDE 0.9 % (FLUSH) 0.9 %
5-40 SYRINGE (ML) INJECTION EVERY 8 HOURS
Status: DISCONTINUED | OUTPATIENT
Start: 2019-06-10 | End: 2019-06-10 | Stop reason: HOSPADM

## 2019-06-10 RX ORDER — NALOXONE HYDROCHLORIDE 0.4 MG/ML
0.1 INJECTION, SOLUTION INTRAMUSCULAR; INTRAVENOUS; SUBCUTANEOUS AS NEEDED
Status: DISCONTINUED | OUTPATIENT
Start: 2019-06-10 | End: 2019-06-10 | Stop reason: HOSPADM

## 2019-06-10 RX ADMIN — SODIUM CHLORIDE, SODIUM LACTATE, POTASSIUM CHLORIDE, AND CALCIUM CHLORIDE 125 ML/HR: 600; 310; 30; 20 INJECTION, SOLUTION INTRAVENOUS at 08:01

## 2019-06-10 RX ADMIN — FENTANYL CITRATE 25 MCG: 50 INJECTION, SOLUTION INTRAMUSCULAR; INTRAVENOUS at 09:03

## 2019-06-10 RX ADMIN — Medication 5 MG: at 09:02

## 2019-06-10 RX ADMIN — DEXAMETHASONE SODIUM PHOSPHATE 4 MG: 4 INJECTION, SOLUTION INTRA-ARTICULAR; INTRALESIONAL; INTRAMUSCULAR; INTRAVENOUS; SOFT TISSUE at 08:49

## 2019-06-10 RX ADMIN — FENTANYL CITRATE 25 MCG: 50 INJECTION, SOLUTION INTRAMUSCULAR; INTRAVENOUS at 08:58

## 2019-06-10 RX ADMIN — ONDANSETRON 4 MG: 2 INJECTION INTRAMUSCULAR; INTRAVENOUS at 08:45

## 2019-06-10 RX ADMIN — FENTANYL CITRATE 50 MCG: 50 INJECTION, SOLUTION INTRAMUSCULAR; INTRAVENOUS at 08:39

## 2019-06-10 RX ADMIN — OXYCODONE HYDROCHLORIDE 5 MG: 5 TABLET ORAL at 10:35

## 2019-06-10 RX ADMIN — MIDAZOLAM HYDROCHLORIDE 2 MG: 1 INJECTION, SOLUTION INTRAMUSCULAR; INTRAVENOUS at 08:39

## 2019-06-10 RX ADMIN — PROPOFOL 150 MG: 10 INJECTION, EMULSION INTRAVENOUS at 08:46

## 2019-06-10 RX ADMIN — SODIUM CHLORIDE, SODIUM LACTATE, POTASSIUM CHLORIDE, AND CALCIUM CHLORIDE: 600; 310; 30; 20 INJECTION, SOLUTION INTRAVENOUS at 09:07

## 2019-06-10 RX ADMIN — GLYCOPYRROLATE 0.1 MG: 0.2 INJECTION INTRAMUSCULAR; INTRAVENOUS at 08:45

## 2019-06-10 RX ADMIN — LIDOCAINE HYDROCHLORIDE 60 MG: 20 INJECTION, SOLUTION EPIDURAL; INFILTRATION; INTRACAUDAL; PERINEURAL at 08:46

## 2019-06-10 RX ADMIN — Medication 5 MG: at 08:51

## 2019-06-10 NOTE — INTERVAL H&P NOTE
H&P Update:   Yaneli Sheikh was seen and examined. History and physical has been reviewed. The patient has been examined.  There have been no significant clinical changes since the completion of the originally dated History and Physical.

## 2019-06-10 NOTE — PERIOP NOTES
Spoke with Shraddha Pang the  and informed him that the patient is here for a D&C with suction today.

## 2019-06-10 NOTE — PERIOP NOTES
TRANSFER - IN REPORT:    Verbal report received from NICOLÁS Rushing RN(name) on James Mejias  being received from OR(unit) for routine post - op      Report consisted of patients Situation, Background, Assessment and   Recommendations(SBAR). Information from the following report(s) SBAR, OR Summary, Procedure Summary and Intake/Output was reviewed with the receiving nurse. Opportunity for questions and clarification was provided. Assessment completed upon patients arrival to unit and care assumed.

## 2019-06-10 NOTE — OP NOTES
Outpatient Operative Note     Surgeon(s): Adriana Landry MD  Assistant: none  Pre-operative Diagnosis: Missed   Post-operative Diagnosis: same as preop diagnosis. Procedure(s) Performed: Suction Dilation & Curettage                                     Anesthesia: General with LMA  Findings: missed , 9 week size anteverted uterus  Complications: none   Estimated Blood Loss: 200 cc  Specimens: products of conception  Implants:  none  Procedure: The patient was taken to the operating room where she was placed in the supine position. After general anesthesia was initiated, she was placed in the dorsal lithotomy position. The patient was then prepped and draped in the usual fashion. Time out was completed. Attention was first turned to the vagina where the speculum was placed. The anterior lip of the cervix was grasped with a single toothed tenaculum. The cervix was dilated. A 10 [place] suction curet was passed multiple times. A sharp curet was then used to gently assess the cavity. A final pass with the suction curet was performed. All instruments were removed from the vagina. Bimanual massage was performed and the uterus was noted to be firm. Silver nitrate was applied to the anterior lip of the cervix. Good hemostasis was noted. All sponge counts were correct. The patient was awakened and taken to the recovery room in stable condition.       Adriana Landry MD

## 2019-06-10 NOTE — ANESTHESIA PREPROCEDURE EVALUATION
Relevant Problems   No relevant active problems       Anesthetic History   No history of anesthetic complications            Review of Systems / Medical History  Patient summary reviewed, nursing notes reviewed and pertinent labs reviewed    Pulmonary  Within defined limits                 Neuro/Psych              Cardiovascular                  Exercise tolerance: >4 METS     GI/Hepatic/Renal  Within defined limits              Endo/Other  Within defined limits           Other Findings              Physical Exam    Airway  Mallampati: II  TM Distance: 4 - 6 cm  Neck ROM: normal range of motion   Mouth opening: Normal     Cardiovascular    Rhythm: regular  Rate: normal         Dental  No notable dental hx       Pulmonary  Breath sounds clear to auscultation               Abdominal  GI exam deferred       Other Findings            Anesthetic Plan    ASA: 1  Anesthesia type: general          Induction: Intravenous  Anesthetic plan and risks discussed with: Patient

## 2019-06-10 NOTE — DISCHARGE INSTRUCTIONS
Patient Education        Dilation and Curettage: What to Expect at Home  Your Recovery  Dilation and curettage (D&C) is a procedure to remove tissue from the inside of the uterus. The doctor used a curved tool, called a curette, to gently scrape tissue from your uterus. You are likely to have a backache, or cramps similar to menstrual cramps, and pass small clots of blood from your vagina for the first few days. You may continue to have light vaginal bleeding for several weeks after the procedure. You will probably be able to go back to most of your normal activities in 1 or 2 days. This care sheet gives you a general idea about how long it will take for you to recover. But each person recovers at a different pace. Follow the steps below to get better as quickly as possible. How can you care for yourself at home? Activity    · Rest when you feel tired. Getting enough sleep will help you recover.     · Avoid strenuous activities, such as bicycle riding, jogging, weight lifting, or aerobic exercise, until your doctor says it is okay.     · Most women are able to return to work the day after the procedure.     · You may have some light vaginal bleeding. Wear sanitary pads if needed. Do not douche or use tampons for 2 weeks or until your doctor says it is okay.     · Ask your doctor when it is okay for you to have sex.     · If you could become pregnant, talk about birth control with your doctor. Do not try to become pregnant until your doctor says it is okay. Diet    · You can eat your normal diet. If your stomach is upset, try bland, low-fat foods like plain rice, broiled chicken, toast, and yogurt.     · Drink plenty of fluids (unless your doctor tells you not to). Medicines    · Your doctor will tell you if and when you can restart your medicines.  He or she will also give you instructions about taking any new medicines.     · If you take blood thinners, such as warfarin (Coumadin), clopidogrel (Plavix), or aspirin, be sure to talk to your doctor. He or she will tell you if and when to start taking those medicines again. Make sure that you understand exactly what your doctor wants you to do.     · Be safe with medicines. Take pain medicines exactly as directed. ? If the doctor gave you a prescription medicine for pain, take it as prescribed. ? If you are not taking a prescription pain medicine, ask your doctor if you can take an over-the-counter medicine.     · If you think your pain medicine is making you sick to your stomach:  ? Take your medicine after meals (unless your doctor has told you not to). ? Ask your doctor for a different pain medicine.     · If your doctor prescribed antibiotics, take them as directed. Do not stop taking them just because you feel better. You need to take the full course of antibiotics. Follow-up care is a key part of your treatment and safety. Be sure to make and go to all appointments, and call your doctor if you are having problems. It's also a good idea to know your test results and keep a list of the medicines you take. When should you call for help? Call 911 anytime you think you may need emergency care. For example, call if:    · You passed out (lost consciousness).     · You have chest pain, are short of breath, or cough up blood.    Call your doctor now or seek immediate medical care if:    · You have bright red vaginal bleeding that soaks one or more pads in an hour, or you have large clots.     · You have vaginal discharge that increases in amount or smells bad.     · You are sick to your stomach or cannot drink fluids.     · You have pain that does not get better after you take pain medicine.     · You cannot pass stools or gas.     · You have symptoms of a blood clot in your leg (called a deep vein thrombosis), such as:  ? Pain in your calf, back of the knee, thigh, or groin. ?  Redness and swelling in your leg.     · You have signs of infection, such as:  ? Increased pain, swelling, warmth, or redness. ? Red streaks leading from the area. ? Pus draining from the area. ? A fever.    Watch closely for changes in your health, and be sure to contact your doctor if you have any problems. Where can you learn more? Go to http://kasandra-jaime.info/. Enter [phone] in the search box to learn more about \"Dilation and Curettage: What to Expect at Home. \"  Current as of: September 5, 2018  Content Version: 11.9  © 1128-4563 [employer]. Care instructions adapted under license by [employer] (which disclaims liability or warranty for this information). If you have questions about a medical condition or this instruction, always ask your healthcare professional. [place] [address] any warranty or liability for your use of this information. DISCHARGE SUMMARY from Nurse    PATIENT INSTRUCTIONS:    After general anesthesia or intravenous sedation, for 24 hours or while taking prescription Narcotics:  · Limit your activities  · Do not drive and operate hazardous machinery  · Do not make important personal or business decisions  · Do  not drink alcoholic beverages  · If you have not urinated within 8 hours after discharge, please contact your surgeon on call. Report the following to your surgeon:  · Excessive pain, swelling, redness or odor of or around the surgical area  · Temperature over 100.5  · Nausea and vomiting lasting longer than 4 hours or if unable to take medications  · Any signs of decreased circulation or nerve impairment to extremity: change in color, persistent  numbness, tingling, coldness or increase pain  · Any questions    What to do at Home:  Recommended activity: Activity as tolerated, Ambulate in house and No driving while on analgesics,     If you experience any of the following symptoms above, please follow up with Dr. Marcus Sweeney.     *  Please give a list of your current medications to your Primary Care Provider. *  Please update this list whenever your medications are discontinued, doses are      changed, or new medications (including over-the-counter products) are added. *  Please carry medication information at all times in case of emergency situations. These are general instructions for a healthy lifestyle:    No smoking/ No tobacco products/ Avoid exposure to second hand smoke  Surgeon General's Warning:  Quitting smoking now greatly reduces serious risk to your health. Obesity, smoking, and sedentary lifestyle greatly increases your risk for illness    A healthy diet, regular physical exercise & weight monitoring are important for maintaining a healthy lifestyle    You may be retaining fluid if you have a history of heart failure or if you experience any of the following symptoms:  Weight gain of 3 pounds or more overnight or 5 pounds in a week, increased swelling in our hands or feet or shortness of breath while lying flat in bed. Please call your doctor as soon as you notice any of these symptoms; do not wait until your next office visit. Recognize signs and symptoms of STROKE:    F-face looks uneven    A-arms unable to move or move unevenly    S-speech slurred or non-existent    T-time-call 911 as soon as signs and symptoms begin-DO NOT go       Back to bed or wait to see if you get better-TIME IS BRAIN. Warning Signs of HEART ATTACK     Call 911 if you have these symptoms:   Chest discomfort. Most heart attacks involve discomfort in the center of the chest that lasts more than a few minutes, or that goes away and comes back. It can feel like uncomfortable pressure, squeezing, fullness, or pain.  Discomfort in other areas of the upper body. Symptoms can include pain or discomfort in one or both arms, the back, neck, jaw, or stomach.  Shortness of breath with or without chest discomfort.  Other signs may include breaking out in a cold sweat, nausea, or lightheadedness.   Don't wait more than five minutes to call 911 - MINUTES MATTER! Fast action can save your life. Calling 911 is almost always the fastest way to get lifesaving treatment. Emergency Medical Services staff can begin treatment when they arrive -- up to an hour sooner than if someone gets to the hospital by car. Patient armband removed and shredded      The discharge information has been reviewed with the patient and caregiver. The patient and caregiver verbalized understanding. Discharge medications reviewed with the patient and caregiver and appropriate educational materials and side effects teaching were provided.   ___________________________________________________________________________________________________________________________________

## 2019-06-10 NOTE — ANESTHESIA POSTPROCEDURE EVALUATION
Procedure(s):  DILATATION AND CURETTAGE WITH SUCTION. general    Anesthesia Post Evaluation        Comments: Post-Anesthesia Evaluation and Assessment    Cardiovascular Function/Vital Signs  /55   Pulse 74   Temp 37.3 °C (99.1 °F)   Resp 19   Ht 5' (1.524 m)   Wt 56.9 kg (125 lb 8 oz)   SpO2 98%   BMI 24.51 kg/m²     Patient is status post Procedure(s):  DILATATION AND CURETTAGE WITH SUCTION. Nausea/Vomiting: Controlled. Postoperative hydration reviewed and adequate. Pain:  Pain Scale 1: Numeric (0 - 10) (06/10/19 0945)  Pain Intensity 1: 0 (06/10/19 0945)   Managed. Neurological Status:   Neuro (WDL): Exceptions to WDL (06/10/19 0945)   At baseline. Mental Status and Level of Consciousness: Arousable. Pulmonary Status:   O2 Device: Room air (06/10/19 0930)   Adequate oxygenation and airway patent. Complications related to anesthesia: None    Post-anesthesia assessment completed. No concerns. Patient has met all discharge requirements. Signed By: Geetha Skinner MD    Lisa 10, 2019                   Vitals Value Taken Time   /55 6/10/2019  9:45 AM   Temp 37.3 °C (99.1 °F) 6/10/2019  9:21 AM   Pulse 72 6/10/2019  9:47 AM   Resp 21 6/10/2019  9:47 AM   SpO2 98 % 6/10/2019  9:47 AM   Vitals shown include unvalidated device data.

## 2019-06-10 NOTE — PERIOP NOTES
Reviewed PTA medication list with patient/caregiver and patient/caregiver denies any additional medications. Patient admits to having a responsible adult care for them at home for at least 24 hours after surgery. Patient denies elisa chewing/swallowing difficulties. Patient encouraged to use Faviola paws warming system and informed that using Faviola paws to regulate body temperature prior to a procedure has been shown to help reduce the risks of blood clots and infection. Dual skin assessment & fall risk band verification completed with Ericka Curtis RN.

## 2020-10-08 LAB
CHLAMYDIA, EXTERNAL: NEGATIVE
HBSAG, EXTERNAL: NEGATIVE
HIV, EXTERNAL: NEGATIVE
N. GONORRHEA, EXTERNAL: NEGATIVE
RPR, EXTERNAL: NORMAL
RUBELLA, EXTERNAL: NORMAL

## 2021-02-24 ENCOUNTER — HOSPITAL ENCOUNTER (OUTPATIENT)
Age: 26
Setting detail: OBSERVATION
Discharge: HOME | End: 2021-02-25
Attending: OBSTETRICS & GYNECOLOGY | Admitting: OBSTETRICS & GYNECOLOGY
Payer: MEDICAID

## 2021-02-24 PROBLEM — Z34.90 PREGNANCY: Status: ACTIVE | Noted: 2021-02-24

## 2021-02-24 PROBLEM — Z3A.26 26 WEEKS GESTATION OF PREGNANCY: Status: ACTIVE | Noted: 2021-02-24

## 2021-02-24 PROBLEM — O24.919 DIABETES IN PREGNANCY: Status: ACTIVE | Noted: 2021-02-24

## 2021-02-24 LAB
GLUCOSE BLD STRIP.AUTO-MCNC: 159 MG/DL (ref 70–110)
GLUCOSE BLD STRIP.AUTO-MCNC: 94 MG/DL (ref 70–110)

## 2021-02-24 PROCEDURE — 74011636637 HC RX REV CODE- 636/637: Performed by: ADVANCED PRACTICE MIDWIFE

## 2021-02-24 PROCEDURE — 99218 HC RM OBSERVATION: CPT

## 2021-02-24 PROCEDURE — 36415 COLL VENOUS BLD VENIPUNCTURE: CPT

## 2021-02-24 PROCEDURE — 59025 FETAL NON-STRESS TEST: CPT

## 2021-02-24 PROCEDURE — 82962 GLUCOSE BLOOD TEST: CPT

## 2021-02-24 PROCEDURE — 83036 HEMOGLOBIN GLYCOSYLATED A1C: CPT

## 2021-02-24 RX ORDER — DEXTROSE MONOHYDRATE 100 MG/ML
125-250 INJECTION, SOLUTION INTRAVENOUS AS NEEDED
Status: DISCONTINUED | OUTPATIENT
Start: 2021-02-24 | End: 2021-02-25 | Stop reason: HOSPADM

## 2021-02-24 RX ORDER — INSULIN LISPRO 100 [IU]/ML
INJECTION, SOLUTION INTRAVENOUS; SUBCUTANEOUS
Status: DISCONTINUED | OUTPATIENT
Start: 2021-02-24 | End: 2021-02-25 | Stop reason: HOSPADM

## 2021-02-24 RX ORDER — MAGNESIUM SULFATE 100 %
4 CRYSTALS MISCELLANEOUS AS NEEDED
Status: DISCONTINUED | OUTPATIENT
Start: 2021-02-24 | End: 2021-02-25 | Stop reason: HOSPADM

## 2021-02-24 RX ADMIN — INSULIN LISPRO 2 UNITS: 100 INJECTION, SOLUTION INTRAVENOUS; SUBCUTANEOUS at 21:47

## 2021-02-24 NOTE — PROGRESS NOTES
[phone]- Patient present to labor and delivery  28weeks 4days due to patient having elevated glucose screening test in office on . Patient was adv to come to hospital for monitoring. Abdomin soft to palpate, TOCO and ultrasound applied. 1733- Blood sugar- 94, patient states she last ate @ [address] delivered    [phone]- Patient sitting up to eat    1915- Bedside and Verbal shift change report given to REBA Matos RN  (oncoming nurse) by DEAN Henry RN  (offgoing nurse). Report included the following information SBAR, Kardex and MAR.

## 2021-02-25 VITALS
SYSTOLIC BLOOD PRESSURE: 99 MMHG | HEART RATE: 82 BPM | TEMPERATURE: 97.8 F | HEIGHT: 60 IN | WEIGHT: 143 LBS | DIASTOLIC BLOOD PRESSURE: 62 MMHG | RESPIRATION RATE: 17 BRPM | OXYGEN SATURATION: 99 % | BODY MASS INDEX: 28.07 KG/M2

## 2021-02-25 PROBLEM — Z3A.28 28 WEEKS GESTATION OF PREGNANCY: Status: ACTIVE | Noted: 2021-02-25

## 2021-02-25 LAB
EST. AVERAGE GLUCOSE BLD GHB EST-MCNC: 120 MG/DL
GLUCOSE BLD STRIP.AUTO-MCNC: 117 MG/DL (ref 70–110)
GLUCOSE BLD STRIP.AUTO-MCNC: 158 MG/DL (ref 70–110)
HBA1C MFR BLD: 5.8 % (ref 4.2–5.6)

## 2021-02-25 PROCEDURE — 74011636637 HC RX REV CODE- 636/637: Performed by: ADVANCED PRACTICE MIDWIFE

## 2021-02-25 PROCEDURE — 59025 FETAL NON-STRESS TEST: CPT

## 2021-02-25 PROCEDURE — 90471 IMMUNIZATION ADMIN: CPT

## 2021-02-25 PROCEDURE — 99218 HC RM OBSERVATION: CPT

## 2021-02-25 PROCEDURE — 82962 GLUCOSE BLOOD TEST: CPT

## 2021-02-25 PROCEDURE — 90686 IIV4 VACC NO PRSV 0.5 ML IM: CPT | Performed by: ADVANCED PRACTICE MIDWIFE

## 2021-02-25 PROCEDURE — 74011250636 HC RX REV CODE- 250/636: Performed by: ADVANCED PRACTICE MIDWIFE

## 2021-02-25 RX ADMIN — INFLUENZA VIRUS VACCINE 0.5 ML: 15; 15; 15; 15 SUSPENSION INTRAMUSCULAR at 13:05

## 2021-02-25 RX ADMIN — INSULIN LISPRO 2 UNITS: 100 INJECTION, SOLUTION INTRAVENOUS; SUBCUTANEOUS at 13:13

## 2021-02-25 NOTE — PROGRESS NOTES
Nutrition Brief:    Briefly spoke with patient  he stated he was providing the patient food, assured him to not hesitate to contact nutrition services with any additional needs.     Jhony Vazquez RDN

## 2021-02-25 NOTE — DISCHARGE SUMMARY
Antepartum Discharge Summary     Name: Dejuan Hernandez MRN: [id number]  SSN: [id number]    YOB: 1995  Age: 31 y.o. Sex: female      Allergies: Patient has no known allergies. Admit Date: 2021    Discharge Date: 2021     Admitting Physician: Bradley Christopher MD     Attending Physician:  Mayra Obrien MD     * Admission Diagnoses: Pregnancy [Z34.90]  Diabetes in pregnancy [O24.919]  26 weeks gestation of pregnancy [Z3A.26]    * Discharge Diagnoses:   Hospital Problems as of 2021 Date Reviewed: 2021          Codes Class Noted - Resolved POA    28 weeks gestation of pregnancy ICD-10-CM: Z3A.28  ICD-9-CM: V22.2  2021 - Present Yes        Pregnancy ICD-10-CM: Z34.90  ICD-9-CM: V22.2  2021 - Present Yes        Diabetes in pregnancy ICD-10-CM: O24.919  ICD-9-CM: 648.00  2021 - Present Yes        26 weeks gestation of pregnancy ICD-10-CM: Z3A.26  ICD-9-CM: V22.2  2021 - Present No             Lab Results   Component Value Date/Time    ABO/Rh(D) O POSITIVE 06/10/2019 07:45 AM    Rubella, External immune  2018    GrBStrep, External negative  2019    ABO,Rh O Positive  2018      Immunization History   Administered Date(s) Administered    Influenza Vaccine (Quad) PF (>6 Mo Flulaval, Fluarix, and >3 Yrs [address] [place] [address]) 2021       * Discharge Condition: good    * No surgery found [facility] Course:    - Diabetes - Gestational:                                           - Good glycemic control was obtained with insulin. - Continue Diabetic diet. - Discharge home with close glucose monitoring and  testing.    - Diabetic consult completed    * Disposition: Home    Discharge Medications:   Current Discharge Medication List      STOP taking these medications       ibuprofen (MOTRIN) 600 mg tablet Comments:   Reason for Stopping:               * Follow-up Care/Patient Instructions:   Activity: Activity as tolerated  Diet: Diabetic Diet  Patient to have NST on Monday in office. Office will call and verify time  [facility] consult asap for GDM  Glucometer teaching completed inpatient  Glucometer ordered through [facility]. office.  Please notify office if prescription is not ready    Follow-up Information     Follow up With Specialties Details Why Contact Info    Other, MD Margaret    Patient can only remember the practice name and not the physician             Jeanmarie Lockhart CNM

## 2021-02-25 NOTE — PROGRESS NOTES
Bedside and Verbal shift change report given to REBA Matos RN(oncoming nurse) by DEAN Henry RN (offgoing nurse). Report included the following information SBAR, Kardex, Procedure Summary, Intake/Output, MAR and Recent Results. 2000 Pt's  at bedside. Pt doesn't speak English and he has been translating for mom. Dad states he is going home to take care of couple's other children. Explained that we have a video remote  available so no  need for  to stay. 2120 Called lab to ask about A1C. [place] states that A1C labs are send out to [facility] and lab will not go out until tomorrow. 2132 2 hour post dinner     2147 2 units of Insulin administered. 0015 TRANSFER - OUT REPORT:    Verbal report given to FERMIN Traore(name) on Kelsea Roberto  being transferred to postpartum(unit) for routine progression of care       Report consisted of patients Situation, Background, Assessment and   Recommendations(SBAR). Information from the following report(s) SBAR, Kardex, Procedure Summary, Intake/Output, MAR and Recent Results was reviewed with the receiving nurse. Lines:       Opportunity for questions and clarification was provided.       Patient transported with:   Registered Nurse

## 2021-02-25 NOTE — PROGRESS NOTES
High Risk Obstetrics Progress Note    Name: Kelsea Roberto MRN: [id number]  SSN: [id number]    YOB: 1995  Age: 31 y.o. Sex: female      Subjective:      LOS: 0 days    Estimated Date of Delivery: 5/15/21   Gestational Age Today: 30w6d     Patient admitted for Elevated blood glucose on 1hr gtt in office/GDM. States she does not have abdominal pain  , contractions, headache , nausea and vomiting, shortness of breath, vaginal bleeding  and vaginal leaking of fluid . Objective:     Vitals:  Blood pressure 99/62, pulse 82, temperature 97.8 °F (36.6 °C), resp. rate 17, height 5' (1.524 m), weight 64.9 kg (143 lb), SpO2 99 %, unknown if currently breastfeeding. Temp (24hrs), Av.2 °F (36.8 °C), Min:97.8 °F (36.6 °C), Max:98.7 °F (60.0 °C)    Systolic (16FXH), FWU:352 , Min:99 , HBC:501      Diastolic (15OJC), ISQ:72, Min:59, Max:75       Intake and Output:         Physical Exam:  Patient without distress. Lung: normal respiratory effort  Abdomen: soft, nontender  Fundus: soft and non tender  Cervical Exam: Deferred       Membranes:  Intact    Uterine Activity:  None    Fetal Heart Rate:  Reactive        Labs:   Recent Results (from the past 36 hour(s))   GLUCOSE, POC    Collection Time: 21  5:33 PM   Result Value Ref Range    Glucose (POC) 94 70 - 110 mg/dL   GLUCOSE, POC    Collection Time: 21  9:32 PM   Result Value Ref Range    Glucose (POC) 159 (H) 70 - 110 mg/dL   GLUCOSE, POC    Collection Time: 21  5:31 AM   Result Value Ref Range    Glucose (POC) 117 (H) 70 - 110 mg/dL       Assessment and Plan:       Active Problems:    Pregnancy (2021)      Diabetes in pregnancy (2021)      26 weeks gestation of pregnancy (2021)         GDM: treat with oral hypoglycemics and insulin as needed  Diabetic consult today  NST reactive  Discharge home today  Follow up with [facility] for GDM teaching  Follow up in office on Monday for NST  Dr. Deidra Salas aware of pt status      Levi Landry, CNM

## 2021-02-25 NOTE — DIABETES MGMT
GLYCEMIC CONTROL PROGRESS NOTE:    - new dx GDM  -  at bedside to provide interpretation (Alisa language)  - GC provided in depth education on GDM, importance of lifestyle modifications including diet & exercise  - GC provided pt/ with Alisa Language DM education materials on nutrition including what foods contain carbs, starchy carbs, & impact of sugary beverages  -  told this writer pt consumes a lot of sugary beverages everyday including soda & juice   - recommend MNT for GDM  - recommend monitor FBG & PPG   -  educated on how to monitor BG & able to do a return demonstration with sample meter ( mg/dL)     Recent Glucose Results:   Lab Results   Component Value Date/Time    GLUCPOC 158 (H) 02/25/2021 01:27 PM    GLUCPOC 117 (H) 02/25/2021 05:31 AM    GLUCPOC 159 (H) 02/24/2021 09:32 PM     Lab Results   Component Value Date/Time    Hemoglobin A1c 5.8 (H) 02/24/2021 08:05 PM     Zachariah Cameron MS, RN, CDE  Glycemic Control Team  [phone]  Pager [phone] (M-TH 8:00-4:30P)  *After Hours pager [phone]

## 2021-02-25 NOTE — DIABETES MGMT
Diabetes Patient/Family Education Record  Factors That  May Influence Patients Ability  to Learn or  Comply with Recommendations   [x]   Language barrier    []   Cultural needs   []   Motivation    []   Cognitive limitation    []   Physical   []   Education    []   Physiological factors   []   Hearing/vision/speaking impairment   []   Bahai beliefs    []   Financial factors   []  Other:   []  No factors identified at this time.      Person Instructed:   [x]   Patient   [x]   Family   []  Other     Preference for Learning:   [x]   Verbal   [x]   Written   [x]  Demonstration     Level of Comprehension & Competence:    [x]  Good                                      [] Fair                                     []  Poor                             []  Needs Reinforcement   [x]  Teachback completed    Education Component: Gestational DM patient education booklet & Alisa language DM nutrition ed materials   []  Medication management, including how to administer insulin (if appropriate) and potential medication interactions    [x]  Nutritional management -obtain usual meal pattern   [x]  Exercise; encouraged pt to do 30 mins of exercise 4-5 times a week low impact like walking   []  Signs, symptoms, and treatment of hyperglycemia and hypoglycemia   [] Prevention, recognition and treatment of hyperglycemia and hypoglycemia   [x]  Importance of blood glucose monitoring and how to obtain a blood glucose meter    [x]  Instruction on use of the blood glucose meter   []  Discuss the importance of HbA1C monitoring    []  Sick day guidelines   []  Proper use and disposal of lancets, needles, syringes or insulin pens (if appropriate)   []  Potential complications of GDM including macrosomia & hypoglycemia   [] Information about whom to contact in case of emergency or for more information    [x]  Goal:  Patient/family will demonstrate understanding of Diabetes Self Management Skills by: (date) ___2/26____  Plan for post-discharge education or self-management support:    [] Outpatient class schedule provided            [] Patient Declined    [] Scheduled for outpatient classes (date) _______  Verify:  Does patient understand how diabetes medications work? ______n/a______________________  Does patient know what their most recent A1c is? ________yes___________________________  Does patient monitor glucose at home? _______________n/a____________________________  Does patient have difficulty obtaining diabetes medications or testing supplies? ________no_________         Lynne Marin MS, RN, CDE  Glycemic Control Team  [phone]  Pager [phone] (M-TH 8:00-4:30P)  *After Hours pager [phone]

## 2021-02-25 NOTE — DISCHARGE INSTRUCTIONS
Patient Education        Gestational Diabetes Diet: Care Instructions  Your Care Instructions     Gestational diabetes is a form of diabetes that can happen during pregnancy. It usually goes away after the baby is born. Diabetes means that your pancreas can't make enough insulin or your body does not use insulin properly. Insulin helps sugar enter your cells, where it is used for energy. You may be able to control your blood sugar while you are pregnant by eating a healthy diet and getting regular exercise. A dietitian or certified diabetes educator (CDE) can help you make a food plan. This plan will help control your blood sugar and provide good nutrition for you and your baby. If diet and exercise don't lower or control your blood sugar, you may need diabetes medicine or insulin. Follow-up care is a key part of your treatment and safety. Be sure to make and go to all appointments, and call your doctor if you are having problems. It's also a good idea to know your test results and keep a list of the medicines you take. How can you care for yourself at home? · Learn which foods have carbohydrate. Eating too much carbohydrate will cause your blood sugar to go too high. Carbohydrate foods include:  ? Breads, cereals, pasta, and rice. ? Dried beans and starchy vegetables, like corn, peas, and potatoes. ? Fruits and fruit juice, milk, and yogurt. ? Candy, table sugar, soda pop, and drinks sweetened with sugar. · Learn how much carbohydrate you need each day. A dietitian or certified diabetes educator (CDE) can teach you how to keep track of how much carbohydrate you eat. · Try to eat the same amount of carbohydrate at each meal. This will help keep your blood sugar steady. Do not save up your daily allowance of carbohydrate to eat at one meal.  · Limit foods that have added sugar. This includes candy, desserts, and soda pop.  These foods need to be counted as part of your total carbohydrate intake for the day.  · Do not drink alcohol. Alcohol is not safe for you or your baby. · Do not skip meals. Your blood sugar may drop too low if you skip meals and use insulin. · Write down what you eat every day. Review your record with your dietitian or CDE to see if you are eating the right amounts of foods. · Check your blood sugar first thing in the morning before you eat. Then check your blood sugar 1 to 2 hours after the first bite of each meal (or as your doctor recommends). This will help you see how the food you eat affects your blood sugar. Keep track of these levels. Share the record with your doctor. When should you call for help? Watch closely for changes in your health, and be sure to contact your doctor if:    · You have questions about your diet.     · You often have problems with high or low blood sugar. Where can you learn more? Go to http://www.gray.com/  Enter M291 in the search box to learn more about \"Gestational Diabetes Diet: Care Instructions. \"  Current as of: 2019               Content Version: 12.6  © 3877-3691 [employer]. Care instructions adapted under license by [employer] (which disclaims liability or warranty for this information). If you have questions about a medical condition or this instruction, always ask your healthcare professional. [address] any warranty or liability for your use of this information. Patient Education        Learning About a Large for Gestational Age (LGA) [de-identified]  What is an LGA baby? A \"large for gestational age\" (LGA) baby is bigger than an average baby. An LGA  weighs about 9 pounds or more at birth. Women are more likely to have an LGA baby if they have diabetes, have gained a lot of weight while pregnant, or are obese. What happens when you have an LGA baby? Giving birth to an [address] baby can be hard on the baby and the mother.  In a vaginal delivery, the large baby has to squeeze through the narrow birth canal. The squeezing can injure the baby's shoulders or arms. The mother may have more difficult and painful labor. The doctor may recommend that the baby be born by  section. At birth, your baby may have low blood sugar and trouble breathing. The doctor will watch your baby carefully after birth for breathing problems. Your doctor may also do blood tests. He or she will track your baby's blood sugar for up to a day to make sure it is normal. Most LGA babies go home from the hospital within a few days. How do you care for an LGA baby? · Make sure your baby feeds normally. Getting enough to eat will help keep your baby's blood sugar at a normal level. · Your doctor will give you care instructions if your baby had has injuries from the delivery. Follow-up care is a key part of your child's treatment and safety. Be sure to make and go to all appointments, and call your doctor if your child is having problems. It's also a good idea to know your child's test results and keep a list of the medicines your child takes. Where can you learn more? Go to http://www.gray.com/  Enter L246 in the search box to learn more about \"Learning About a Large for Gestational Age (LGA) Baby. \"  Current as of: May 27, 2020               Content Version: 12.6  © 9235-8587 [employer]. Care instructions adapted under license by [employer] (which disclaims liability or warranty for this information). If you have questions about a medical condition or this instruction, always ask your healthcare professional. [place] [address] any warranty or liability for your use of this information.

## 2021-02-25 NOTE — PROGRESS NOTES
9556  Report received from Nurse Palmira Trejo RN.  
 
0800  Updated white board. 7301  Notified Dietary of Diabetic management consult. Spoke with Humble Rico who is going to relay information to Opal Murguia. 56 Spoke with  on the phone to translate plan of care. He will be in after 10am today. [address], will assess patient today. 1008 Dropped patient off on L/D for MST. 
 
1312 Glucometer ordered, flu shot given, d/c orders will be placed,  educator to return to conduct glucometer instruction.

## 2021-02-25 NOTE — ROUTINE PROCESS
0005: TRANSFER - IN REPORT: 
Verbal report received from ELENA Reyes(name) on [facility]  being received from L&D (unit) for routine progression of care Report consisted of patients Situation, Background, Assessment and Recommendations(SBAR). Information from the following report(s) SBAR, Kardex, Procedure Summary, Intake/Output, MAR and Recent Results was reviewed with the receiving nurse. Opportunity for questions and clarification was provided. Assessment completed upon patients arrival to unit and care assumed. 4732: Bedside and Verbal shift change report given to IVONNE South RN (oncoming nurse) by Bobo Bryan RN (offgoing nurse). Report included the following information SBAR, Kardex, Procedure Summary, Intake/Output, MAR and Recent Results.

## 2021-02-25 NOTE — PROGRESS NOTES
0005: TRANSFER - IN REPORT:    Verbal report received from REBA Matos RN (name) on [facility]  being received from L&D (unit) for routine progression of care      Report consisted of patients Situation, Background, Assessment and   Recommendations(SBAR). Information from the following report(s) SBAR, Kardex, Procedure Summary, Intake/Output, MAR and Recent Results was reviewed with the receiving nurse. Opportunity for questions and clarification was provided. Assessment completed upon patients arrival to unit and care assumed. 0750: Verbal shift change report given to IVONNE Tuttle (oncoming nurse) by Cordell Moon RN and SN Denia (offgoing nurse). Report included the following information SBAR, Kardex, MAR and Recent Results.

## 2021-04-19 LAB — HBSAG, EXTERNAL: NEGATIVE

## 2021-05-10 ENCOUNTER — HOSPITAL ENCOUNTER (INPATIENT)
Age: 26
LOS: 1 days | Discharge: HOME | DRG: 560 | End: 2021-05-12
Attending: OBSTETRICS & GYNECOLOGY | Admitting: OBSTETRICS & GYNECOLOGY
Payer: MEDICAID

## 2021-05-10 PROBLEM — Z3A.39 39 WEEKS GESTATION OF PREGNANCY: Status: ACTIVE | Noted: 2021-05-10

## 2021-05-10 LAB
ABO + RH BLD: NORMAL
BASOPHILS # BLD: 0 K/UL (ref 0–0.1)
BASOPHILS NFR BLD: 0 % (ref 0–2)
BLOOD GROUP ANTIBODIES SERPL: NORMAL
DIFFERENTIAL METHOD BLD: ABNORMAL
EOSINOPHIL # BLD: 0.1 K/UL (ref 0–0.4)
EOSINOPHIL NFR BLD: 1 % (ref 0–5)
ERYTHROCYTE [DISTWIDTH] IN BLOOD BY AUTOMATED COUNT: 15.6 % (ref 11.6–14.5)
GLUCOSE SERPL-MCNC: 74 MG/DL (ref 74–99)
HCT VFR BLD AUTO: 37.4 % (ref 35–45)
HGB BLD-MCNC: 12.1 G/DL (ref 12–16)
LYMPHOCYTES # BLD: 1.9 K/UL (ref 0.9–3.6)
LYMPHOCYTES NFR BLD: 27 % (ref 21–52)
MCH RBC QN AUTO: 28.3 PG (ref 24–34)
MCHC RBC AUTO-ENTMCNC: 32.4 G/DL (ref 31–37)
MCV RBC AUTO: 87.6 FL (ref 74–97)
MONOCYTES # BLD: 0.4 K/UL (ref 0.05–1.2)
MONOCYTES NFR BLD: 5 % (ref 3–10)
NEUTS SEG # BLD: 4.6 K/UL (ref 1.8–8)
NEUTS SEG NFR BLD: 66 % (ref 40–73)
PLATELET # BLD AUTO: 211 K/UL (ref 135–420)
PMV BLD AUTO: 11.5 FL (ref 9.2–11.8)
RBC # BLD AUTO: 4.27 M/UL (ref 4.2–5.3)
SPECIMEN EXP DATE BLD: NORMAL
WBC # BLD AUTO: 7.1 K/UL (ref 4.6–13.2)

## 2021-05-10 PROCEDURE — 82947 ASSAY GLUCOSE BLOOD QUANT: CPT

## 2021-05-10 PROCEDURE — 77030028565 HC CATH CERV RIPNG BLN COOK -B

## 2021-05-10 PROCEDURE — 86901 BLOOD TYPING SEROLOGIC RH(D): CPT

## 2021-05-10 PROCEDURE — 85025 COMPLETE CBC W/AUTO DIFF WBC: CPT

## 2021-05-10 RX ORDER — OXYTOCIN/RINGER'S LACTATE 30/500 ML
10 PLASTIC BAG, INJECTION (ML) INTRAVENOUS AS NEEDED
Status: DISCONTINUED | OUTPATIENT
Start: 2021-05-10 | End: 2021-05-11 | Stop reason: HOSPADM

## 2021-05-10 RX ORDER — HYDROMORPHONE HYDROCHLORIDE 1 MG/ML
1 INJECTION, SOLUTION INTRAMUSCULAR; INTRAVENOUS; SUBCUTANEOUS
Status: DISCONTINUED | OUTPATIENT
Start: 2021-05-10 | End: 2021-05-11 | Stop reason: HOSPADM

## 2021-05-10 RX ORDER — OXYTOCIN/0.9 % SODIUM CHLORIDE 30/500 ML
87.3 PLASTIC BAG, INJECTION (ML) INTRAVENOUS AS NEEDED
Status: DISCONTINUED | OUTPATIENT
Start: 2021-05-10 | End: 2021-05-11 | Stop reason: HOSPADM

## 2021-05-10 RX ORDER — MINERAL OIL
30 OIL (ML) ORAL AS NEEDED
Status: DISCONTINUED | OUTPATIENT
Start: 2021-05-10 | End: 2021-05-11 | Stop reason: HOSPADM

## 2021-05-10 RX ORDER — METHYLERGONOVINE MALEATE 0.2 MG/ML
0.2 INJECTION INTRAVENOUS AS NEEDED
Status: DISCONTINUED | OUTPATIENT
Start: 2021-05-10 | End: 2021-05-11 | Stop reason: HOSPADM

## 2021-05-10 RX ORDER — BUTORPHANOL TARTRATE 2 MG/ML
2 INJECTION INTRAMUSCULAR; INTRAVENOUS
Status: DISCONTINUED | OUTPATIENT
Start: 2021-05-10 | End: 2021-05-11 | Stop reason: HOSPADM

## 2021-05-10 RX ORDER — LIDOCAINE HYDROCHLORIDE 10 MG/ML
20 INJECTION, SOLUTION EPIDURAL; INFILTRATION; INTRACAUDAL; PERINEURAL AS NEEDED
Status: DISCONTINUED | OUTPATIENT
Start: 2021-05-10 | End: 2021-05-11 | Stop reason: HOSPADM

## 2021-05-10 RX ORDER — TERBUTALINE SULFATE 1 MG/ML
0.25 INJECTION SUBCUTANEOUS
Status: DISCONTINUED | OUTPATIENT
Start: 2021-05-10 | End: 2021-05-11 | Stop reason: HOSPADM

## 2021-05-10 RX ORDER — OXYTOCIN/0.9 % SODIUM CHLORIDE 30/500 ML
2 PLASTIC BAG, INJECTION (ML) INTRAVENOUS
Status: DISCONTINUED | OUTPATIENT
Start: 2021-05-11 | End: 2021-05-12 | Stop reason: HOSPADM

## 2021-05-10 RX ORDER — SODIUM CHLORIDE, SODIUM LACTATE, POTASSIUM CHLORIDE, CALCIUM CHLORIDE 600; 310; 30; 20 MG/100ML; MG/100ML; MG/100ML; MG/100ML
125 INJECTION, SOLUTION INTRAVENOUS CONTINUOUS
Status: DISCONTINUED | OUTPATIENT
Start: 2021-05-10 | End: 2021-05-11 | Stop reason: HOSPADM

## 2021-05-10 RX ORDER — NALBUPHINE HYDROCHLORIDE 10 MG/ML
10 INJECTION, SOLUTION INTRAMUSCULAR; INTRAVENOUS; SUBCUTANEOUS
Status: DISCONTINUED | OUTPATIENT
Start: 2021-05-10 | End: 2021-05-11 | Stop reason: HOSPADM

## 2021-05-10 NOTE — PROGRESS NOTES
0  39+2 weeks gestation here for IOL for GDM, diet controlled    1800 [place] at bedside to place cook balloon. 80/80cc placed in each balloon. To be removed in 12 hours. 1930 Bedside and Verbal shift change report given to WILLARD Mccoy RN (oncoming nurse) by Nayan Buenrostro RN   (offgoing nurse). Report included the following information SBAR, Kardex, Intake/Output, MAR, Recent Results and Med Rec Status,and  procedure.

## 2021-05-10 NOTE — H&P
History & Physical    Name: Jenny Heller MRN: [id number]  SSN: [id number]    YOB: 1995  Age: 31 y.o. Sex: female      Subjective:     Estimated Date of Delivery: 5/15/21  OB History    Para Term  AB Living   3 2 1     2   SAB TAB Ectopic Molar Multiple Live Births           0 2      # Outcome Date GA Lbr Ricardo/2nd Weight Sex Delivery Anes PTL Lv   3 Current            2 Para 19  01:25 :22 3.05 kg F Vag-Spont None N KIRA   1 Term 2016        KIRA       Ms. Marda Angelucci is admitted with pregnancy at 39w2d for induction of labor due to gestational diabetes. Prenatal course was complicated by anemia and GDM. Please see prenatal records for details. No past medical history on file. No past surgical history on file. Social History     Occupational History    Not on file   Tobacco Use    Smoking status: Never Smoker    Smokeless tobacco: Never Used   Substance and Sexual Activity    Alcohol use: Never     Frequency: Never    Drug use: Never    Sexual activity: Yes     No family history on file. No Known Allergies  Prior to Admission medications    Not on File        Review of Systems: A comprehensive review of systems was negative. Objective:     Vitals: There were no vitals filed for this visit. Physical Exam:  Patient without distress.   Lung: normal respiratory effort  Abdomen: soft, nontender  Fundus: soft and non tender  Perineum: blood absent, amniotic fluid absent  Cervical Exam: /-3 per last office visit  Membranes:  Intact  Fetal Heart Rate: Reactive  Baseline: 130 per minute  Variability: moderate  Accelerations: yes  Decelerations: none          Prenatal Labs:   Lab Results   Component Value Date/Time    ABO/Rh(D) O POSITIVE 06/10/2019 07:45 AM    Rubella, External immune  2018    HBsAg, External negative  2018    HIV, External negative  2018    RPR, External Non reactive 2018    Gonorrhea, External negative  2018    Chlamydia, External negative  2018    ABO,Rh O Positive  2018    GrBStrep, External negative  2019       Impression/Plan:     Active Problems:    IUP (intrauterine pregnancy), incidental (2019)      Diabetes in pregnancy (2021)      39 weeks gestation of pregnancy (5/10/2021)         Plan:     Charmaine Pearl for induction of labor  Continuous EFM  Reassuring fetal status  GBS negative  Place IV/Routine labs  Cook balloon for cervical ripening  Start pitocin in am   Anticipate   Dr. Sandi Fuentes aware of pt admission      Monique Hawthorne CNM

## 2021-05-10 NOTE — PROGRESS NOTES
Nutrition Assessment     Type and Reason for Visit: Positive nutrition screen    Nutrition Recommendations/Plan:      Nutrition Assessment:  Positive nutrition screen and GDM referal made in error.  Pt in active labor    Malnutrition Assessment:  Malnutrition Status:       Estimated Daily Nutrient Needs:  Energy (kcal):     Protein (g):          Fluid (ml/day):       Nutrition Related Findings:         Current Nutrition Therapies:  DIET REGULAR    Anthropometric Measures:  · Height:     · Current Body Wt:     · BMI:      Nutrition Diagnosis:   · No nutrition diagnosis at this time related to   as evidenced by        Nutrition Intervention:  Food and/or Nutrient Delivery:    Nutrition Education and Counseling:    Coordination of Nutrition Care:      Goals:          Nutrition Monitoring and Evaluation:   Behavioral-Environmental Outcomes:    Food/Nutrient Intake Outcomes:    Physical Signs/Symptoms Outcomes:      Discharge Planning:          Electronically signed by Elis Ford RD on 5/10/2021 at 7:25 PM    Contact Number:

## 2021-05-10 NOTE — PROGRESS NOTES
Labor Progress Note  Patient seen, fetal heart rate and contraction pattern evaluated, patient examined. No data found. Physical Exam:  Cervical Exam:  Deferred  Membranes:  Intact  Uterine Activity: Occasional  Fetal Heart Rate: Reactive  Baseline: 130 per minute  Variability: moderate  Accelerations: yes  Decelerations: none    Assessment:  IUP @ 44 wga here for IOL for postdates. Cook balloon to be placed followed by induction of labor with pitocin. Procedures, risks, and benefits of cook explained to patient with opportunity for answering all questions given. Verbal consent obtained. Patient was placed in lithotomy position. Latha Basilio catheter with stylet guide placed in cervical os without difficulty. Uterine balloon was tested with 50 cc then inflated to a total of 80 cc of sterile NS. Once uterine balloon was filled the vaginal balloon was filled and tested with 50 cc of NS. Once correct placement confirmed the vaginal balloon was filled to a total of 80 cc of NS. External catheter end secured to thigh. Patient tolerated procedure well. Fetal tracing remains reassuring.    Plan:  Continue to monitor for labor and reassuring fetal tracing  Anticipate   Expectant management  Plan to DC cook in 12 hours and start pitocin IV titration for IOL      Anne Umanzor CNM

## 2021-05-10 NOTE — PROGRESS NOTES
Problem: Patient Education: Go to Patient Education Activity  Goal: Patient/Family Education  Outcome: Progressing Towards Goal     Problem: Vaginal Delivery: Day of Deliver-Laboring  Goal: Off Pathway (Use only if patient is Off Pathway)  Outcome: Progressing Towards Goal  Goal: Activity/Safety  Outcome: Progressing Towards Goal  Goal: Medications  Outcome: Progressing Towards Goal  Goal: Respiratory  Outcome: Progressing Towards Goal  Goal: Treatments/Interventions/Procedures  Outcome: Progressing Towards Goal  Goal: *Vital signs within defined limits  Outcome: Progressing Towards Goal  Goal: *Labs within defined limits  Outcome: Progressing Towards Goal  Goal: *Hemodynamically stable  Outcome: Progressing Towards Goal  Goal: *Optimal pain control at patient's stated goal  Outcome: Progressing Towards Goal

## 2021-05-11 PROBLEM — Z34.90 ENCOUNTER FOR INDUCTION OF LABOR: Status: ACTIVE | Noted: 2021-05-11

## 2021-05-11 PROCEDURE — 65270000029 HC RM PRIVATE

## 2021-05-11 PROCEDURE — 75410000000 HC DELIVERY VAGINAL/SINGLE

## 2021-05-11 PROCEDURE — 75410000003 HC RECOV DEL/VAG/CSECN EA 0.5 HR

## 2021-05-11 PROCEDURE — 75410000002 HC LABOR FEE PER 1 HR

## 2021-05-11 PROCEDURE — 74011250636 HC RX REV CODE- 250/636: Performed by: ADVANCED PRACTICE MIDWIFE

## 2021-05-11 PROCEDURE — 74011250637 HC RX REV CODE- 250/637: Performed by: ADVANCED PRACTICE MIDWIFE

## 2021-05-11 PROCEDURE — 3E033VJ INTRODUCTION OF OTHER HORMONE INTO PERIPHERAL VEIN, PERCUTANEOUS APPROACH: ICD-10-PCS | Performed by: OBSTETRICS & GYNECOLOGY

## 2021-05-11 RX ORDER — PROMETHAZINE HYDROCHLORIDE 25 MG/ML
25 INJECTION, SOLUTION INTRAMUSCULAR; INTRAVENOUS
Status: DISCONTINUED | OUTPATIENT
Start: 2021-05-11 | End: 2021-05-12 | Stop reason: HOSPADM

## 2021-05-11 RX ORDER — ACETAMINOPHEN 325 MG/1
650 TABLET ORAL
Status: DISCONTINUED | OUTPATIENT
Start: 2021-05-11 | End: 2021-05-12 | Stop reason: HOSPADM

## 2021-05-11 RX ORDER — IBUPROFEN 400 MG/1
800 TABLET ORAL EVERY 8 HOURS
Status: DISCONTINUED | OUTPATIENT
Start: 2021-05-11 | End: 2021-05-12 | Stop reason: HOSPADM

## 2021-05-11 RX ORDER — ZOLPIDEM TARTRATE 5 MG/1
5 TABLET ORAL
Status: DISCONTINUED | OUTPATIENT
Start: 2021-05-11 | End: 2021-05-12 | Stop reason: HOSPADM

## 2021-05-11 RX ORDER — AMOXICILLIN 250 MG
1 CAPSULE ORAL
Status: DISCONTINUED | OUTPATIENT
Start: 2021-05-11 | End: 2021-05-12 | Stop reason: HOSPADM

## 2021-05-11 RX ADMIN — Medication 2 MILLI-UNITS/MIN: at 07:49

## 2021-05-11 RX ADMIN — IBUPROFEN 800 MG: 400 TABLET, FILM COATED ORAL at 23:47

## 2021-05-11 RX ADMIN — SODIUM CHLORIDE, SODIUM LACTATE, POTASSIUM CHLORIDE, AND CALCIUM CHLORIDE 125 ML/HR: 600; 310; 30; 20 INJECTION, SOLUTION INTRAVENOUS at 07:43

## 2021-05-11 RX ADMIN — IBUPROFEN 800 MG: 400 TABLET, FILM COATED ORAL at 11:41

## 2021-05-11 NOTE — LACTATION NOTE
Infant latched and nursing well at [id number]. Through interpretor [id number], mom educated on breastfeeding basics--hunger cues, feeding on demand, waking baby if baby sleeps too long between feeds, importance of skin to skin, positioning and latching, risk of pacifier use and supplemental feedings, and importance of rooming in--and use of log sheet. Mom also educated on benefits of breastfeeding for herself and baby. Mom verbalized understanding. No questions at this time.

## 2021-05-11 NOTE — PROGRESS NOTES
1930 Bedside and Verbal shift change report given to WILLARD Acosta RN (oncoming nurse) by Konstantin Hendrix RN (offgoing nurse). Report included the following information SBAR, Kardex, Procedure Summary, Intake/Output, MAR and Recent Results. [address] [facility] [address] Karis Pod utilized for assessment. Pt states she would like pain meds after dinner. Will notify nurse when she is ready. 0015 Verbal shift change report given and care relinquished to DEAN Dillon RN (oncoming nurse) by Konstantin Acosta RN (offgoing nurse). Report included the following information SBAR, Kardex, Procedure Summary, Intake/Output, MAR and Recent Results.

## 2021-05-11 NOTE — PROGRESS NOTES
Problem: Patient Education: Go to Patient Education Activity  Goal: Patient/Family Education  Outcome: Progressing Towards Goal     Problem: Vaginal Delivery: Day of Deliver-Laboring  Goal: Off Pathway (Use only if patient is Off Pathway)  Outcome: Progressing Towards Goal  Goal: Activity/Safety  Outcome: Progressing Towards Goal  Goal: Consults, if ordered  Outcome: Progressing Towards Goal  Goal: Diagnostic Test/Procedures  Outcome: Progressing Towards Goal  Goal: Discharge Planning  Outcome: Progressing Towards Goal  Goal: Medications  Outcome: Progressing Towards Goal  Goal: Respiratory  Outcome: Progressing Towards Goal  Goal: Treatments/Interventions/Procedures  Outcome: Progressing Towards Goal  Goal: *Vital signs within defined limits  Outcome: Progressing Towards Goal  Goal: *Labs within defined limits  Outcome: Progressing Towards Goal  Goal: *Hemodynamically stable  Outcome: Progressing Towards Goal  Goal: *Optimal pain control at patient's stated goal  Outcome: Progressing Towards Goal     Problem: Pain  Goal: *Control of Pain  Outcome: Progressing Towards Goal     Problem: Patient Education: Go to Patient Education Activity  Goal: Patient/Family Education  Outcome: Progressing Towards Goal

## 2021-05-11 NOTE — PROGRESS NOTES
0010 Bedside verbal report received, care assumed at this time. Awake and alert, EFM/toco in progress. IV infusing LR at 125ml/hr, site to right arm without redness and edema. Denies pain at this time as communicated by her . Discussed plan of care with , understanding verbalized. 0600 80 ml removed from vaginal and uterine balloons, landaverde balloon remoed without difficulty. Monitors off, IV resealed. Discussed option to shower and eat breakfast with , pt declines to shower at this time. 0700 Telephone report given to NICANOR Solis CNM.    0087 Bedside and Verbal shift change report given to DEAN Najera (oncoming nurse) by DEAN Mitchell RN (offgoing nurse). Report included the following information SBAR, Kardex, Intake/Output, MAR, Recent Results and Med Rec Status.

## 2021-05-11 NOTE — LACTATION NOTE
This note was copied from a baby's chart. 0 LPN in room at this time. Will return. [phone] FOB translated, mom stating \"no milk yet\". Discussed supply and demand and the importance of beginning each feeding at the breast, then to follow up with bottle, if desired. Encouraged to call for assistance. No additional questions at this time. Will remain available.

## 2021-05-11 NOTE — PROGRESS NOTES
3422- Patient resting in bed with spouse at bedside, no needs at this time    1048- NICANOR West at bedside, SVE- 10cm    1058- Start pushing    1106- TOB live male infant, Jose Pimentel

## 2021-05-11 NOTE — L&D DELIVERY NOTE
Delivery Summary    Patient: Eda Durbin MRN: [id number]  SSN: [id number]    YOB: 1995  Age: 31 y.o. Sex: female       Information for the patient's :  Berna Finnegan [[id number]]       Labor Events:    Labor: No    Steroids: None   Cervical Ripening Date/Time:       Cervical Ripening Type:     Antibiotics During Labor: No   Rupture Identifier: Sac 1    Rupture Date/Time: 2021 10:48 AM   Rupture Type: SROM   Amniotic Fluid Volume: Scant    Amniotic Fluid Description: Clear    Amniotic Fluid Odor: None    Induction: Christine Bulb (balloon); Oxytocin       Induction Date/Time: 5/10/2021 4:00 PM    Indications for Induction: Diabetes    Augmentation:     Augmentation Date/Time:      Indications for Augmentation:     Labor complications: None       Additional complications:        Delivery Events:  Indications For Episiotomy:     Episiotomy: None   Perineal Laceration(s): None   Repaired:     Periurethral Laceration Location:      Repaired:     Labial Laceration Location:     Repaired:     Sulcal Laceration Location:     Repaired:     Vaginal Laceration Location:     Repaired:     Cervical Laceration Location:     Repaired:     Repair Suture: None   Number of Repair Packets:     Estimated Blood Loss (ml): 150ml   Quantitative Blood Loss (ml)                Delivery Date: 2021    Delivery Time: 11:06 AM  Delivery Type: Vaginal, Spontaneous  Sex:  Male    Gestational Age: 38w3d   Delivery Clinician:  Shahbaz Cramer  Living Status: Living   Delivery Location: L&D            APGARS  One minute Five minutes Ten minutes   Skin color: 1   1        Heart rate: 2   2        Grimace: 2   2        Muscle tone: 2   2        Breathin   2        Totals: 9   9            Presentation: Vertex    Position: Left Occiput Anterior  Resuscitation Method:  Suctioning-bulb; Tactile Stimulation     Meconium Stained: None      Cord Information: 3 Vessels  Complications: None  Cord around: Delayed cord clamping? Yes  Cord clamped date/time:2021 11:09 AM  Disposition of Cord Blood: Lab    Blood Gases Sent?: No    Placenta:  Date/Time: 2021 11:12 AM  Removal: Spontaneous      Appearance: Normal      Measurements:  Birth Weight: 3.305 kg      Birth Length: 50.2 cm      Head Circumference: 34.5 cm      Chest Circumference: 33 cm     Abdominal Girth: 30 cm    Other Providers:   ;GOLDEN ROBERTSON;WAYNE MACARIO;MARIA ELENA FIGUEROA, Obstetrician;Primary Nurse;Primary [place] Nurse;Midwife         Group B Strep:   Lab Results   Component Value Date/Time    GrBStrep, External negative  2019     Information for the patient's :  Mani Mayer [[id number]]   No results found for: ABORH, PCTABR, PCTDIG, BILI, ABORHEXT, ABORH     No results for input(s): PCO2CB, PO2CB, HCO3I, SO2I, IBD, PTEMPI, SPECTI, PHICB, ISITE, IDEV, IALLEN in the last 72 hours. Vaginal Delivery Procedure Note    Name: Latrell Marks   Medical Record Number: [id number]   YOB: 1995  Today's Date: May 11, 2021        Procedure: VAGINAL DELIVERY without suction or forceps     Anesthesia:  none    Extra Procedure Details:       Estimated Blood Loss: 150    Fetal Description: weston male     Anterior shoulder: right    Umbilical Cord: 3 vessels present and Cord blood sent to lab for type, Rh, and Nikhil' test    Episiotomy: no   Tear: no             Cord Blood Results:   Information for the patient's :  Mani Leyla [[id number]]   @[facility]@       Birth Information:   Information for the patient's :  Mani Mayer [[id number]]           Apgars: 8,9 at 1 and 5 min respectively    Specimens: Placenta was not sent     Placenta: Spontaneous with assist and apparently intact on exam          Complications:  none     Birth Weight: see baby part of chart    Mother's Condition: good  Baby's Condition: stable  Sponge and needle count: correct    I was present for the delivery. Delivered for our practice.     Signed: Vianey Urbina CNM      May 11, 2021

## 2021-05-11 NOTE — PROGRESS NOTES
TRANSFER - IN REPORT:    Verbal report received from DEAN Henry RN (name) on [employer]  being received from L and JESSENIA (unit) for routine progression of care      Report consisted of patients Situation, Background, Assessment and   Recommendations(SBAR). Information from the following report(s) SBAR, Intake/Output, MAR and Recent Results was reviewed with the receiving nurse. Opportunity for questions and clarification was provided. Assessment completed upon patients arrival to unit and care assumed. VSS. Oriented to room and unit. Infant falls and safety sheet signed. FOB translated. To call for assistance to bathroom 1 more time. Denies needs. 1800-resting in bed. Denies needs. 1845-to feed infant. 1925-Bedside and Verbal shift change report given to MALI Hua RN (oncoming nurse) by ABI Kwok LPN (offgoing nurse). Report given with SBAR, Kardex, Intake/Output, MAR and Recent Results.

## 2021-05-11 NOTE — PROGRESS NOTES
Problem: Vaginal Delivery: Day of Deliver-Laboring  Goal: Off Pathway (Use only if patient is Off Pathway)  Outcome: Progressing Towards Goal  Goal: Activity/Safety  Outcome: Progressing Towards Goal  Goal: Consults, if ordered  Outcome: Progressing Towards Goal  Goal: Diagnostic Test/Procedures  Outcome: Progressing Towards Goal  Goal: Discharge Planning  Outcome: Progressing Towards Goal  Goal: Medications  Outcome: Progressing Towards Goal  Goal: Respiratory  Outcome: Progressing Towards Goal  Goal: Treatments/Interventions/Procedures  Outcome: Progressing Towards Goal  Goal: *Vital signs within defined limits  Outcome: Progressing Towards Goal  Goal: *Labs within defined limits  Outcome: Progressing Towards Goal  Goal: *Hemodynamically stable  Outcome: Progressing Towards Goal  Goal: *Optimal pain control at patient's stated goal  Outcome: Progressing Towards Goal     Problem: Pain  Goal: *Control of Pain  Outcome: Progressing Towards Goal     Problem: Patient Education: Go to Patient Education Activity  Goal: Patient/Family Education  Outcome: Progressing Towards Goal

## 2021-05-11 NOTE — PROGRESS NOTES
OB Labor Note    Assessment:   IOL for GDM    Plan:   Continue to monitor labor   Anticipate     Subjective:   Pt. does not have any complaints. Pt. is feeling contractions. Pt. is feeling fetal movement. Objective:     Visit Vitals  BP (!) 114/58   Pulse 68   Temp 97.4 °F (36.3 °C)   Resp 14   Wt 70.3 kg (155 lb)   SpO2 99%   BMI 30.27 kg/m²      Cervical Exam  Dilation (cm): 10  Eff: 100 %  Station: 0  Vaginal exam done by? : Ardeth Leaks, CNM   Membrane Status: Intact     Patient Vitals for the past 4 hrs: Mode Fetal Heart Rate Variability Decelerations Accelerations RN Reviewed Strip?   21 0945      Yes   21 0930 External 135 6-25 BPM None Yes Yes   21 0915 External 135 6-25 BPM None Yes Yes   21 0900 External 135 6-25 BPM None Yes Yes   21 0845      Yes   21 0830 External 140 6-25 BPM None Yes Yes   21 0815 External 135 6-25 BPM None Yes Yes        EFM: Baseline 135, moderate variability, + accels, prolonged decel. Ctx q 2-4 minutes lasting 70-90 seconds. Cat II tracing.     Giselle Law CNM  2021 10:48 PM

## 2021-05-12 VITALS
SYSTOLIC BLOOD PRESSURE: 108 MMHG | RESPIRATION RATE: 18 BRPM | DIASTOLIC BLOOD PRESSURE: 66 MMHG | HEART RATE: 70 BPM | OXYGEN SATURATION: 100 % | WEIGHT: 155 LBS | TEMPERATURE: 98 F | BODY MASS INDEX: 30.27 KG/M2

## 2021-05-12 LAB
HCT VFR BLD AUTO: 34.7 % (ref 35–45)
HGB BLD-MCNC: 10.8 G/DL (ref 12–16)

## 2021-05-12 PROCEDURE — 74011250637 HC RX REV CODE- 250/637: Performed by: ADVANCED PRACTICE MIDWIFE

## 2021-05-12 PROCEDURE — 85018 HEMOGLOBIN: CPT

## 2021-05-12 PROCEDURE — 36415 COLL VENOUS BLD VENIPUNCTURE: CPT

## 2021-05-12 RX ORDER — IBUPROFEN 800 MG/1
800 TABLET ORAL EVERY 8 HOURS
Qty: 90 TAB | Refills: 1 | Status: SHIPPED | OUTPATIENT
Start: 2021-05-12

## 2021-05-12 RX ADMIN — IBUPROFEN 800 MG: 400 TABLET, FILM COATED ORAL at 15:56

## 2021-05-12 RX ADMIN — IBUPROFEN 800 MG: 400 TABLET, FILM COATED ORAL at 08:01

## 2021-05-12 NOTE — PROGRESS NOTES
Assumed care of pt.  0801-VSS. Assessment completed. Scheduled med given. 0901-pain med effective. 1000-FOB in room. Denies needs. 1100-resting in bed. Denies needs. 1155-infant out to room. Bands verified. 1315-awake in bed. Denies needs. 1440-resting in bed. Denies needs. 1555-VSS. Assessment completed. Scheduled meds gien. 1620-resting in bed. Denies needs. 1700-discharge instructions reviewed with patient(translated by father) who verbalized understanding and signed. 1735-discharged home with infant via wheelchair.

## 2021-05-12 NOTE — PROGRESS NOTES
Problem: Patient Education: Go to Patient Education Activity  Goal: Patient/Family Education  Outcome: Resolved/Met     Problem: Vaginal Delivery: Day of Delivery-Post delivery  Goal: Activity/Safety  Outcome: Resolved/Met  Goal: Consults, if ordered  Outcome: Resolved/Met  Goal: Nutrition/Diet  Outcome: Resolved/Met  Goal: Discharge Planning  Outcome: Resolved/Met  Goal: Medications  Outcome: Resolved/Met  Goal: Treatments/Interventions/Procedures  Outcome: Resolved/Met  Goal: *Vital signs within defined limits  Outcome: Resolved/Met  Goal: *Labs within defined limits  Outcome: Resolved/Met  Goal: *Hemodynamically stable  Outcome: Resolved/Met  Goal: *Optimal pain control at patient's stated goal  Outcome: Resolved/Met  Goal: *Participates in infant care  Outcome: Resolved/Met  Goal: *Demonstrates progressive activity  Outcome: Resolved/Met  Goal: *Tolerating diet  Outcome: Resolved/Met     Problem: Vaginal Delivery: Postpartum Day 1  Goal: Activity/Safety  5/12/2021 1633 by Solange Mckeon LPN  Outcome: Resolved/Met  5/12/2021 0844 by Solange Mckeon LPN  Outcome: Progressing Towards Goal  Goal: Consults, if ordered  5/12/2021 1633 by Solange Mckeon LPN  Outcome: Resolved/Met  5/12/2021 0844 by Solange Mckeon LPN  Outcome: Progressing Towards Goal  Goal: Nutrition/Diet  5/12/2021 1633 by Solange Mckeon LPN  Outcome: Resolved/Met  5/12/2021 0844 by Solange Mckeon LPN  Outcome: Progressing Towards Goal  Goal: Discharge Planning  5/12/2021 1633 by Solange Mckeon LPN  Outcome: Resolved/Met  5/12/2021 0844 by Solange Mckeon LPN  Outcome: Progressing Towards Goal  Goal: Medications  5/12/2021 1633 by Solange Mckeon LPN  Outcome: Resolved/Met  5/12/2021 0844 by Solange Mckeon LPN  Outcome: Progressing Towards Goal  Goal: Treatments/Interventions/Procedures  5/12/2021 1633 by Solange Mckeon LPN  Outcome: Resolved/Met  5/12/2021 0844 by Solange Mckeon LPN  Outcome: Progressing Towards Goal  Goal: Psychosocial  5/12/2021 1633 by Tobie Duane, LPN  Outcome: Resolved/Met  5/12/2021 0844 by Tobie Duane, LPN  Outcome: Progressing Towards Goal  Goal: *Vital signs within defined limits  5/12/2021 1633 by Tobie Duane, LPN  Outcome: Resolved/Met  5/12/2021 0844 by Tobie Duane, LPN  Outcome: Progressing Towards Goal  Goal: *Labs within defined limits  5/12/2021 1633 by Tobie Duane, LPN  Outcome: Resolved/Met  5/12/2021 0844 by Tobie Duane, LPN  Outcome: Progressing Towards Goal  Goal: *Hemodynamically stable  5/12/2021 1633 by Tobie Duane, LPN  Outcome: Resolved/Met  5/12/2021 0844 by Tobie Duane, LPN  Outcome: Progressing Towards Goal  Goal: *Optimal pain control at patient's stated goal  5/12/2021 1633 by Tobie Duane, LPN  Outcome: Resolved/Met  5/12/2021 0844 by Tobie Duane, LPN  Outcome: Progressing Towards Goal  Goal: *Participates in infant care  5/12/2021 1633 by Tobie Duane, LPN  Outcome: Resolved/Met  5/12/2021 0844 by Tobie Duane, LPN  Outcome: Progressing Towards Goal  Goal: *Demonstrates progressive activity  5/12/2021 1633 by Tobie Duane, LPN  Outcome: Resolved/Met  5/12/2021 0844 by Tobie Duane, LPN  Outcome: Progressing Towards Goal  Goal: *Performs self perineal care  5/12/2021 1633 by Tobie Duane, LPN  Outcome: Resolved/Met  5/12/2021 0844 by Tobie Duane, LPN  Outcome: Progressing Towards Goal  Goal: *Appropriate parent-infant bonding  5/12/2021 1633 by Tobie Duane, LPN  Outcome: Resolved/Met  5/12/2021 0844 by Tobie Duane, LPN  Outcome: Progressing Towards Goal  Goal: *Tolerating diet  5/12/2021 1633 by Tobie Duane, LPN  Outcome: Resolved/Met  5/12/2021 0844 by Tobie Duane, LPN  Outcome: Progressing Towards Goal  Goal: *Performs self breast care  5/12/2021 1633 by Tobie Duane, LPN  Outcome: Resolved/Met  5/12/2021 0844 by Tobie Duane, LPN  Outcome: Progressing Towards Goal     Problem: Vaginal Delivery: Discharge Outcomes  Goal: *Verbalizes name, dosage, time, side effects, and number of days to continue medications  Outcome: Resolved/Met  Goal: *Describes available resources and support systems  Outcome: Resolved/Met  Goal: *No signs and symptoms of infection  Outcome: Resolved/Met  Goal: *Birth certificate information completed  Outcome: Resolved/Met  Goal: *Received and verbalizes understanding of discharge plan and instructions  Outcome: Resolved/Met  Goal: *Vital signs within defined limits  Outcome: Resolved/Met  Goal: *Labs within defined limits  Outcome: Resolved/Met  Goal: *Hemodynamically stable  Outcome: Resolved/Met  Goal: *Optimal pain control at patient's stated goal  Outcome: Resolved/Met  Goal: *Participates in infant care  Outcome: Resolved/Met  Goal: *Demonstrates progressive activity  Outcome: Resolved/Met  Goal: *Appropriate parent-infant bonding  Outcome: Resolved/Met  Goal: *Tolerating diet  Outcome: Resolved/Met     Problem: Pain  Goal: *Control of Pain  Outcome: Resolved/Met     Problem: Patient Education: Go to Patient Education Activity  Goal: Patient/Family Education  Outcome: Resolved/Met

## 2021-05-12 NOTE — PROGRESS NOTES
Problem: Vaginal Delivery: Day of Delivery-Post delivery  Goal: Activity/Safety  Outcome: Progressing Towards Goal  Goal: Nutrition/Diet  Outcome: Progressing Towards Goal  Goal: Medications  Outcome: Progressing Towards Goal  Goal: *Vital signs within defined limits  Outcome: Progressing Towards Goal  Goal: *Labs within defined limits  Outcome: Progressing Towards Goal  Goal: *Hemodynamically stable  Outcome: Progressing Towards Goal  Goal: *Optimal pain control at patient's stated goal  Outcome: Progressing Towards Goal  Goal: *Participates in infant care  Outcome: Progressing Towards Goal  Goal: *Demonstrates progressive activity  Outcome: Progressing Towards Goal  Goal: *Tolerating diet  Outcome: Progressing Towards Goal     Problem: Pain  Goal: *Control of Pain  Outcome: Progressing Towards Goal

## 2021-05-12 NOTE — DISCHARGE SUMMARY
Obstetrical Discharge Summary     Name: Ford Quarles MRN: [id number]  SSN: [id number]    YOB: 1995  Age: 31 y.o. Sex: female      Allergies: Patient has no known allergies. Admit Date: 5/10/2021    Discharge Date: 2021     Admitting Physician: Shweta Fitzgerald MD     Attending Physician:  Trudy Sebastian MD     * Admission Diagnoses: Encounter for induction of labor [Z34.90]    * Discharge Diagnoses:   Information for the patient's :  [place] [[id number]]   Delivery of a 3.305 kg male infant via Vaginal, Spontaneous on 2021 at 11:06 AM  by Jessica Faith. Apgars were 9  and 9 . Additional Diagnoses:   Hospital Problems as of 2021 Date Reviewed: 5/10/2021          Codes Class Noted - Resolved POA    Encounter for induction of labor ICD-10-CM: Z34.90  ICD-9-CM: V22.1  2021 - Present Unknown        39 weeks gestation of pregnancy ICD-10-CM: Z3A.39  ICD-9-CM: V22.2  5/10/2021 - Present Yes        Diabetes in pregnancy ICD-10-CM: O24.919  ICD-9-CM: 648.00  2021 - Present Yes        IUP (intrauterine pregnancy), incidental ICD-10-CM: Z33.1  ICD-9-CM: V22.2  2019 - Present Yes             Lab Results   Component Value Date/Time    ABO/Rh(D) O POSITIVE 05/10/2021 04:40 PM    Rubella, External Immune 10/08/2020    GrBStrep, External negative  2019    ABO,Rh O Positive  2018      Immunization History   Administered Date(s) Administered    Influenza Vaccine Spectralmind) PF (>6 Mo Flulaval, Fluarix, and >3 Yrs Afluria, Fluzone [id number]) 2021       * Procedures:   * No surgery found *           * Discharge Condition: good    * Hospital Course: Normal hospital course following the delivery. * Disposition: Home    Discharge Medications:   Current Discharge Medication List      START taking these medications    Details   ibuprofen (MOTRIN) 800 mg tablet Take 1 Tab by mouth every eight (8) hours.   Qty: 90 Tab, Refills: 1  Start date: 5/12/2021         CONTINUE these medications which have NOT CHANGED    Details   prenatal 44/LVHU fum/folic/dha (PRENATAL-1 PO) Take 1 Tab by mouth daily. * Follow-up Care/Patient Instructions:   Activity: Activity as tolerated  Diet: Regular Diet  Wound Care: Keep wound clean and dry    Follow-up Information     Follow up With Specialties Details Why Contact Info    Other, MD Margaret    Patient can only remember the practice name and not the physician      Erika Asencio CNM Certified Nurse Midwife Schedule an appointment as soon as possible for a visit in 10 weeks  [id number] [id number] [facility]  [phone]

## 2021-05-12 NOTE — PROGRESS NOTES
Problem: Vaginal Delivery: Postpartum Day 1  Goal: Activity/Safety  Outcome: Progressing Towards Goal  Goal: Consults, if ordered  Outcome: Progressing Towards Goal  Goal: Nutrition/Diet  Outcome: Progressing Towards Goal  Goal: Discharge Planning  Outcome: Progressing Towards Goal  Goal: Medications  Outcome: Progressing Towards Goal  Goal: Treatments/Interventions/Procedures  Outcome: Progressing Towards Goal  Goal: Psychosocial  Outcome: Progressing Towards Goal  Goal: *Vital signs within defined limits  Outcome: Progressing Towards Goal  Goal: *Labs within defined limits  Outcome: Progressing Towards Goal  Goal: *Hemodynamically stable  Outcome: Progressing Towards Goal  Goal: *Optimal pain control at patient's stated goal  Outcome: Progressing Towards Goal  Goal: *Participates in infant care  Outcome: Progressing Towards Goal  Goal: *Demonstrates progressive activity  Outcome: Progressing Towards Goal  Goal: *Performs self perineal care  Outcome: Progressing Towards Goal  Goal: *Appropriate parent-infant bonding  Outcome: Progressing Towards Goal  Goal: *Tolerating diet  Outcome: Progressing Towards Goal  Goal: *Performs self breast care  Outcome: Progressing Towards Goal

## 2021-05-12 NOTE — DISCHARGE INSTRUCTIONS
POST DELIVERY DISCHARGE INSTRUCTIONS    Name: Leta Boothe  YOB: 1995  Primary Diagnosis: Active Problems:    IUP (intrauterine pregnancy), incidental (1/22/2019)      Diabetes in pregnancy (2/24/2021)      39 weeks gestation of pregnancy (5/10/2021)      Encounter for induction of labor (5/11/2021)        General:     Diet/Diet Restrictions:  Eight 8-ounce glasses of fluid daily (water, juices); avoid excessive caffeine intake. Meals/snacks as desired which are high in fiber and carbohydrates and low in fat and cholesterol. Physical Activity / Restrictions / Safety:     Avoid heavy lifting, no more than the baby alone (not the baby in the car seat). Avoid intercourse until you are seen at your postpartum visit. No douching or tampon use. Check with obstetrician before starting or resuming an exercise program.         Discharge Instructions/Special Treatment/Home Care Needs:     Continue prenatal vitamins. Continue to use squirt bottle with warm water on your perineum after each bathroom use until bleeding stops. Call your doctor for the following:     Fever over 101 degrees by mouth. Vaginal bleeding that soaks two pads per hour for more than one hour. Red streaks or increased swelling of legs, painful red streaks on your breast.  If you feel extremely anxious or overwhelmed. If you have thoughts of harming yourself and/or your baby. Pain Management:     Pain Management:   Take Acetaminophen (Tylenol) or Ibuprofen (Advil, Motrin), as directed for pain. Use a warm Sitz bath 3 times daily to relieve perineal or hemorrhoidal discomfort. For hemorrhoidal discomfort, use Tucks and Anusol cream as needed and directed.     Follow-Up Care:     Appointment with MD:   Follow-up Appointments   Procedures    FOLLOW UP VISIT Appointment in: 6 Weeks     Standing Status:   Standing     Number of Occurrences:   1     Order Specific Question:   Appointment in     Answer:   6 Weeks     Telephone number: [phone]      Signed By: Roma Cunha CNM                                                                                                    Patient armband removed and given to patient to take home.   Patient was informed of the privacy risks if armband lost or stolen

## 2021-05-12 NOTE — LACTATION NOTE
Per mom, through FOB interpreting, baby has been feeding well with bottle as mom has \"no milk. \" Supply and demand reviewed. Will page as needed.

## 2021-05-12 NOTE — PROGRESS NOTES
0710 - Bedside and Verbal shift change report given to ABI Kwok LPN by Jenna Mcrae RN. Report included the following information SBAR, Kardex, OR Summary, Intake/Output and MAR.

## 2021-05-12 NOTE — PROGRESS NOTES
Progress Note    Patient: Raheem Dumont MRN: [id number]     YOB: 1995  Age: 31 y.o. Subjective:     Postpartum Day: 1    The patient is feeling well. Pain is  well controlled with current medications. Urinary output is adequate. Baby is feeding via breast without difficulty. Objective:      Patient Vitals for the past 12 hrs:   Temp Pulse Resp BP SpO2   05/12/21 0801 97.1 °F (36.2 °C) 71 16 (!) 109/58 100 %       General:    alert, cooperative, no distress   Lochia:  appropriate   Uterine Fundus:   firm @ umbilicus    Perineum:  well-approximated   DVT Evaluation:  No evidence of DVT seen on physical exam.  Negative Sabra's sign. Lab/Data Review:  Recent Results (from the past 24 hour(s))   HGB & HCT    Collection Time: 05/12/21 12:46 AM   Result Value Ref Range    HGB 10.8 (L) 12.0 - 16.0 g/dL    HCT 34.7 (L) 35.0 - 45.0 %     All lab results for the last 24 hours reviewed. Assessment:     Delivery: spontaneous vaginal delivery    Plan:     Doing well postpartum vaginal delivery. Continue current postpartum care. Encouraged hydration, nutrition and ambulation. Plan DC home tomorrow.     Signed By: Samara Reed CNM     May 12, 2021

## 2022-12-25 NOTE — ED NOTES
Assumed care at discharge  Pt discharged home stable and ambulatory. Pain level at discharge 0/10. Pt discharged with family. Reviewed discharged instructions with pt who verbalized understanding.   Patient armband removed and shredded  Written rx x's 1   used
Patient comes to ED with c/o vaginal bleeding, nausea and vomiting. Patient is scheduled to have a D&C on Monday at 0830 and was instructed to come to the ED if she started to bleed. Patient denies clots or abdominal pain, but c/o nausea and vomiting. Last episode of emesis was approximately 2230. Patient also c/o lower back pain. Patient is non english speaking.  is at bedside.
Pt does not speak English;   in the room
hand grasp, leg strength strong and equal bilaterally
